# Patient Record
Sex: FEMALE | Race: WHITE | NOT HISPANIC OR LATINO | Employment: OTHER | URBAN - METROPOLITAN AREA
[De-identification: names, ages, dates, MRNs, and addresses within clinical notes are randomized per-mention and may not be internally consistent; named-entity substitution may affect disease eponyms.]

---

## 2017-12-20 ENCOUNTER — HOSPITAL ENCOUNTER (EMERGENCY)
Facility: HOSPITAL | Age: 67
Discharge: HOME | End: 2017-12-20
Attending: EMERGENCY MEDICINE | Admitting: EMERGENCY MEDICINE
Payer: MEDICARE

## 2017-12-20 VITALS
BODY MASS INDEX: 23.79 KG/M2 | OXYGEN SATURATION: 95 % | HEIGHT: 68 IN | SYSTOLIC BLOOD PRESSURE: 158 MMHG | WEIGHT: 157 LBS | TEMPERATURE: 98.6 F | HEART RATE: 90 BPM | RESPIRATION RATE: 20 BRPM | DIASTOLIC BLOOD PRESSURE: 70 MMHG

## 2017-12-20 DIAGNOSIS — J45.21 MILD INTERMITTENT ASTHMA WITH EXACERBATION: Primary | ICD-10-CM

## 2017-12-20 PROCEDURE — 96365 THER/PROPH/DIAG IV INF INIT: CPT

## 2017-12-20 PROCEDURE — 96375 TX/PRO/DX INJ NEW DRUG ADDON: CPT

## 2017-12-20 PROCEDURE — 94640 AIRWAY INHALATION TREATMENT: CPT

## 2017-12-20 PROCEDURE — 99283 EMERGENCY DEPT VISIT LOW MDM: CPT

## 2017-12-20 RX ORDER — ALBUTEROL SULFATE 2.5 MG/3ML
5 SOLUTION RESPIRATORY (INHALATION) ONCE
Status: COMPLETED | OUTPATIENT
Start: 2017-12-20 | End: 2017-12-20

## 2017-12-20 RX ORDER — MAGNESIUM SULFATE HEPTAHYDRATE 40 MG/ML
2 INJECTION, SOLUTION INTRAVENOUS ONCE
Status: COMPLETED | OUTPATIENT
Start: 2017-12-20 | End: 2017-12-20

## 2017-12-20 RX ORDER — AZITHROMYCIN 250 MG/1
TABLET, FILM COATED ORAL
Qty: 6 TABLET | Refills: 0 | Status: SHIPPED | OUTPATIENT
Start: 2017-12-20

## 2017-12-20 RX ORDER — METHYLPREDNISOLONE SODIUM SUCCINATE 125 MG/2ML
125 INJECTION, POWDER, LYOPHILIZED, FOR SOLUTION INTRAMUSCULAR; INTRAVENOUS ONCE
Status: COMPLETED | OUTPATIENT
Start: 2017-12-20 | End: 2017-12-20

## 2017-12-20 RX ORDER — PREDNISONE 20 MG/1
TABLET ORAL
Qty: 24 TABLET | Refills: 0 | Status: SHIPPED | OUTPATIENT
Start: 2017-12-20

## 2017-12-20 RX ORDER — ALBUTEROL SULFATE 2.5 MG/3ML
SOLUTION RESPIRATORY (INHALATION)
Status: DISCONTINUED
Start: 2017-12-20 | End: 2017-12-20 | Stop reason: HOSPADM

## 2017-12-20 RX ORDER — DIAZEPAM 5 MG/ML
5 INJECTION, SOLUTION INTRAMUSCULAR; INTRAVENOUS ONCE
Status: COMPLETED | OUTPATIENT
Start: 2017-12-20 | End: 2017-12-20

## 2017-12-20 RX ORDER — SODIUM CHLORIDE FOR INHALATION 0.9 %
3 VIAL, NEBULIZER (ML) INHALATION ONCE
Status: COMPLETED | OUTPATIENT
Start: 2017-12-20 | End: 2017-12-20

## 2017-12-20 RX ADMIN — IPRATROPIUM BROMIDE 0.5 MG: 0.5 SOLUTION RESPIRATORY (INHALATION) at 15:41

## 2017-12-20 RX ADMIN — DIAZEPAM 5 MG: 5 INJECTION, SOLUTION INTRAMUSCULAR; INTRAVENOUS at 14:39

## 2017-12-20 RX ADMIN — Medication 1 MG: at 14:35

## 2017-12-20 RX ADMIN — IPRATROPIUM BROMIDE 1 MG: 0.5 SOLUTION RESPIRATORY (INHALATION) at 14:35

## 2017-12-20 RX ADMIN — ALBUTEROL SULFATE 5 MG: 2.5 SOLUTION RESPIRATORY (INHALATION) at 15:41

## 2017-12-20 RX ADMIN — ALBUTEROL SULFATE 10 MG: 2.5 SOLUTION RESPIRATORY (INHALATION) at 14:34

## 2017-12-20 RX ADMIN — METHYLPREDNISOLONE SODIUM SUCCINATE 125 MG: 125 INJECTION, POWDER, FOR SOLUTION INTRAMUSCULAR; INTRAVENOUS at 14:38

## 2017-12-20 RX ADMIN — ISODIUM CHLORIDE 3 ML: 0.03 SOLUTION RESPIRATORY (INHALATION) at 14:35

## 2017-12-20 RX ADMIN — MAGNESIUM SULFATE HEPTAHYDRATE 2 G: 40 INJECTION, SOLUTION INTRAVENOUS at 14:39

## 2017-12-20 NOTE — ED PROVIDER NOTES
History  Chief Complaint   Patient presents with    Asthma     Pt with asthma flare up, states her at home nebulizers arent really helping anymore  Pt with audible wheezing but otherwise healthy appearing female in no apparent distress      49-year-old female presenting here today with increased shortness of breath  She reports she has a history of asthma        Asthma   Associated symptoms: cough, shortness of breath and wheezing    Associated symptoms: no abdominal pain, no congestion, no diarrhea, no ear pain, no fatigue, no fever, no headaches, no myalgias, no nausea, no rhinorrhea, no sore throat and no vomiting        None       Past Medical History:   Diagnosis Date    Asthma     Hypertension        Past Surgical History:   Procedure Laterality Date    APPENDECTOMY         History reviewed  No pertinent family history  I have reviewed and agree with the history as documented  Social History   Substance Use Topics    Smoking status: Never Smoker    Smokeless tobacco: Never Used    Alcohol use No        Review of Systems   Constitutional: Negative for activity change, fatigue and fever  HENT: Negative for congestion, ear pain, rhinorrhea and sore throat  Eyes: Negative  Respiratory: Positive for cough, shortness of breath and wheezing  Gastrointestinal: Negative for abdominal pain, diarrhea, nausea and vomiting  Endocrine: Negative  Genitourinary: Negative for difficulty urinating, dyspareunia, dysuria, flank pain, frequency, menstrual problem, pelvic pain, urgency, vaginal bleeding, vaginal discharge and vaginal pain  Musculoskeletal: Negative for arthralgias and myalgias  Skin: Negative for color change and pallor  Neurological: Negative for dizziness, speech difficulty, weakness and headaches  Hematological: Negative for adenopathy  Psychiatric/Behavioral: Negative for confusion         Physical Exam  ED Triage Vitals [12/20/17 1421]   Temperature Pulse Respirations Blood Pressure SpO2   98 6 °F (37 °C) 84 22 (!) 208/93 95 %      Temp Source Heart Rate Source Patient Position - Orthostatic VS BP Location FiO2 (%)   Oral Monitor Sitting Left arm --      Pain Score       No Pain           Orthostatic Vital Signs  Vitals:    12/20/17 1421 12/20/17 1541 12/20/17 1703   BP: (!) 208/93 153/70 158/70   Pulse: 84 83 90   Patient Position - Orthostatic VS: Sitting Sitting Sitting       Physical Exam   Constitutional: She is oriented to person, place, and time  She appears well-developed and well-nourished  She is cooperative  Non-toxic appearance  She does not have a sickly appearance  She does not appear ill  No distress  HENT:   Head: Normocephalic and atraumatic  Right Ear: Tympanic membrane and external ear normal    Left Ear: Tympanic membrane and external ear normal    Nose: No rhinorrhea, sinus tenderness or nasal deformity  No epistaxis  Right sinus exhibits no maxillary sinus tenderness and no frontal sinus tenderness  Left sinus exhibits no maxillary sinus tenderness and no frontal sinus tenderness  Mouth/Throat: Oropharynx is clear and moist and mucous membranes are normal  Normal dentition  Eyes: EOM are normal  Pupils are equal, round, and reactive to light  Neck: Normal range of motion  Neck supple  Cardiovascular: Normal rate, regular rhythm and normal heart sounds  No murmur heard  Pulmonary/Chest: Effort normal  No accessory muscle usage  No respiratory distress  She has wheezes  She has no rales  She exhibits no tenderness  Abdominal: Soft  She exhibits no distension  There is no guarding  Musculoskeletal: Normal range of motion  She exhibits no edema or tenderness  Lymphadenopathy:     She has no cervical adenopathy  Neurological: She is alert and oriented to person, place, and time  She exhibits normal muscle tone  Skin: Skin is warm and dry  No rash noted  No erythema  Psychiatric: She has a normal mood and affect     Nursing note and vitals reviewed  ED Medications  Medications   albuterol (2 5 mg/3 mL) 0 083 % inhalation solution **AcuDose Override Pull** (  Not Given 12/20/17 1458)   albuterol inhalation solution 10 mg (10 mg Nebulization Given 12/20/17 1434)   ipratropium (ATROVENT) 0 02 % inhalation solution 1 mg (1 mg Nebulization Given 12/20/17 1435)   sodium chloride 0 9 % inhalation solution 3 mL (3 mL Nebulization Given 12/20/17 1435)   methylPREDNISolone sodium succinate (Solu-MEDROL) injection 125 mg (125 mg Intravenous Given 12/20/17 1438)   magnesium sulfate 2 g/50 mL IVPB (premix) 2 g (0 g Intravenous Stopped 12/20/17 1543)   diazepam (VALIUM) injection 5 mg (5 mg Intravenous Given 12/20/17 1439)   albuterol inhalation solution 5 mg (5 mg Nebulization Given 12/20/17 1541)   ipratropium (ATROVENT) 0 02 % inhalation solution 0 5 mg (0 5 mg Nebulization Given 12/20/17 1541)       Diagnostic Studies  Results Reviewed     None                 No orders to display              Procedures  Procedures       Phone Contacts  ED Phone Contact    ED Course  ED Course                                MDM  Number of Diagnoses or Management Options  Mild intermittent asthma with exacerbation: new and requires workup     Amount and/or Complexity of Data Reviewed  Independent visualization of images, tracings, or specimens: yes    Patient Progress  Patient progress: stable    CritCare Time    Disposition  Final diagnoses:   Mild intermittent asthma with exacerbation     Time reflects when diagnosis was documented in both MDM as applicable and the Disposition within this note     Time User Action Codes Description Comment    12/20/2017  4:58 PM Devika Amaya Add [J45 21] Mild intermittent asthma with exacerbation       ED Disposition     ED Disposition Condition Comment    Discharge  [address] discharge to home/self care      Condition at discharge: Good        Follow-up Information     Follow up With Specialties Details Why Contact Info Additional Information    Regina Carlton MD Internal Medicine  For Continued Evaluation [address] NP [address] Emergency Department Emergency Medicine  If your symptoms worsen, or you are not improving  [address] [facility] [address] [phone] [place] [facility], [address], [place], [address]        Patient's Medications   Discharge Prescriptions    AZITHROMYCIN (ZITHROMAX) 250 MG TABLET    Take 500 mg day 1, 250 mg days 2-5       Start Date: 12/20/2017End Date: --       Order Dose: --       Quantity: 6 tablet    Refills: 0    PREDNISONE 20 MG TABLET    Take 60 mg by mouth daily ×4 days, 40 mg by mouth daily ×4 days, 20 mg by mouth daily ×4 days       Start Date: 12/20/2017End Date: --       Order Dose: --       Quantity: 24 tablet    Refills: 0     No discharge procedures on file      ED Provider  Electronically Signed by           LENO Daniels  12/20/17 8816

## 2017-12-20 NOTE — DISCHARGE INSTRUCTIONS
Asthma, Ambulatory Care   GENERAL INFORMATION:   Asthma  is a lung disease that makes breathing difficult  Chronic inflammation and reactions to triggers narrow the airways in your lungs  Asthma can become life-threatening if it is not managed  Common symptoms include the following:   · Coughing     · Wheezing     · Shortness of breath     · Chest tightness  Seek immediate care for the following symptoms:   · Severe shortness of breath    · Blue or gray lips or nails    · Skin around your neck and ribs pulls in with each breath    · Shortness of breath, even after you take your short-term medicine as directed     · Peak flow numbers in the red zone of your asthma action plan  Treatment for asthma  will depend on how severe it is  Medicine may decrease inflammation, open airways, and make it easier to breathe  Medicines may be inhaled, taken as a pill, or injected  Short-term medicines relieve your symptoms quickly  Long-term medicines are used to prevent future attacks  You may also need medicine to help control your allergies  Manage and prevent future asthma attacks:   · Follow your asthma action pan  This is a written plan that you and your healthcare provider create  It explains which medicine you need and when to change doses if necessary  It also explains how you can monitor symptoms and use a peak flow meter  The meter measures how well your lungs are working  · Manage other health conditions , such as allergies, acid reflux, and sleep apnea  · Identify and avoid triggers  These may include pets, dust mites, mold, and cockroaches  · Do not smoke and avoid others who smoke  If you smoke, it is never too late to quit  Ask your healthcare provider if you need help quitting  · Ask about a flu vaccine  The flu can make your asthma worse  You may need a yearly flu shot  Follow up with your healthcare provider as directed:   You will need to return to make sure your medicine is working and your symptoms are controlled  You may be referred to an asthma or allergy specialist  Damaris Rodriguez may be asked to keep a record of your peak flow values and bring it with you to your appointments  Write down your questions so you remember to ask them during your visits  CARE AGREEMENT:   You have the right to help plan your care  Learn about your health condition and how it may be treated  Discuss treatment options with your caregivers to decide what care you want to receive  You always have the right to refuse treatment  The above information is an  only  It is not intended as medical advice for individual conditions or treatments  Talk to your doctor, nurse or pharmacist before following any medical regimen to see if it is safe and effective for you  © 2014 [address] is for End User's use only and may not be sold, redistributed or otherwise used for commercial purposes  All illustrations and images included in CareNotes® are the copyrighted property of [employer] D [employer] M [employer]  or Yossi Jin

## 2019-12-04 ENCOUNTER — HOSPITAL ENCOUNTER (EMERGENCY)
Facility: HOSPITAL | Age: 69
Discharge: HOME | End: 2019-12-04
Attending: EMERGENCY MEDICINE | Admitting: EMERGENCY MEDICINE
Payer: MEDICARE

## 2019-12-04 VITALS
HEART RATE: 75 BPM | TEMPERATURE: 97.4 F | SYSTOLIC BLOOD PRESSURE: 171 MMHG | OXYGEN SATURATION: 96 % | RESPIRATION RATE: 17 BRPM | DIASTOLIC BLOOD PRESSURE: 75 MMHG

## 2019-12-04 DIAGNOSIS — J45.901 ACUTE ASTHMA EXACERBATION: Primary | ICD-10-CM

## 2019-12-04 PROCEDURE — 94640 AIRWAY INHALATION TREATMENT: CPT

## 2019-12-04 PROCEDURE — 99283 EMERGENCY DEPT VISIT LOW MDM: CPT

## 2019-12-04 PROCEDURE — 99284 EMERGENCY DEPT VISIT MOD MDM: CPT | Performed by: PHYSICIAN ASSISTANT

## 2019-12-04 RX ORDER — ALBUTEROL SULFATE 90 UG/1
2 AEROSOL, METERED RESPIRATORY (INHALATION) ONCE
Status: COMPLETED | OUTPATIENT
Start: 2019-12-04 | End: 2019-12-04

## 2019-12-04 RX ORDER — PREDNISONE 20 MG/1
60 TABLET ORAL ONCE
Status: COMPLETED | OUTPATIENT
Start: 2019-12-04 | End: 2019-12-04

## 2019-12-04 RX ORDER — PREDNISONE 10 MG/1
10 TABLET ORAL DAILY
Qty: 63 TABLET | Refills: 0 | Status: SHIPPED | OUTPATIENT
Start: 2019-12-04

## 2019-12-04 RX ORDER — ALBUTEROL SULFATE 1.25 MG/3ML
1 SOLUTION RESPIRATORY (INHALATION) EVERY 6 HOURS PRN
Qty: 30 VIAL | Refills: 0 | Status: SHIPPED | OUTPATIENT
Start: 2019-12-04

## 2019-12-04 RX ORDER — ALBUTEROL SULFATE 2.5 MG/3ML
5 SOLUTION RESPIRATORY (INHALATION) ONCE
Status: COMPLETED | OUTPATIENT
Start: 2019-12-04 | End: 2019-12-04

## 2019-12-04 RX ADMIN — ALBUTEROL SULFATE 2 PUFF: 90 AEROSOL, METERED RESPIRATORY (INHALATION) at 16:05

## 2019-12-04 RX ADMIN — PREDNISONE 60 MG: 20 TABLET ORAL at 15:34

## 2019-12-04 RX ADMIN — ALBUTEROL SULFATE 5 MG: 2.5 SOLUTION RESPIRATORY (INHALATION) at 15:34

## 2019-12-04 RX ADMIN — IPRATROPIUM BROMIDE 0.5 MG: 0.5 SOLUTION RESPIRATORY (INHALATION) at 15:34

## 2019-12-04 NOTE — ED PROVIDER NOTES
History  Chief Complaint   Patient presents with    Asthma     "my asthma is acting up" treatments at home not working, onset 1-2 weeks      70-year-old female patient here for evaluation of asthma exacerbation  Onset about 1 week ago  She notes she keeps getting exposed to something work that is causing her to flare up  She took 2 days off work and got better however when she went back she started have recurring symptoms  Used a breathing treatment earlier this morning which seemed to help but states at this point she feels she needs steroids  She has been admitted in the past and also intubated in the past for her asthma  Denies anything unusual about this exacerbation, compared to prior exacerbations that is currently less severe  She denies fevers chills nausea vomiting or cough  No leg pain or swelling no recent travels, traumas, surgeries  History provided by:  Patient   used: No    Shortness of Breath   Severity:  Mild  Onset quality:  Gradual  Duration:  1 week  Timing:  Intermittent  Progression:  Worsening  Chronicity:  New  Context: occupational exposure    Context: not activity, not animal exposure, not emotional upset, not fumes, not known allergens, not pollens, not smoke exposure, not strong odors, not URI and not weather changes    Relieved by: Inhaler  Worsened by:  Nothing  Ineffective treatments:  None tried  Associated symptoms: wheezing    Associated symptoms: no abdominal pain, no chest pain, no claudication, no cough, no diaphoresis, no ear pain, no fever, no headaches, no hemoptysis, no neck pain, no PND, no rash, no sore throat, no sputum production, no syncope, no swollen glands and no vomiting        Prior to Admission Medications   Prescriptions Last Dose Informant Patient Reported? Taking?    azithromycin (ZITHROMAX) 250 mg tablet   No No   Sig: Take 500 mg day 1, 250 mg days 2-5   predniSONE 20 mg tablet   No No   Sig: Take 60 mg by mouth daily ×4 days, 40 mg by mouth daily ×4 days, 20 mg by mouth daily ×4 days      Facility-Administered Medications: None       Past Medical History:   Diagnosis Date    Asthma     Hypertension        Past Surgical History:   Procedure Laterality Date    APPENDECTOMY         History reviewed  No pertinent family history  I have reviewed and agree with the history as documented  Social History     Tobacco Use    Smoking status: Never Smoker    Smokeless tobacco: Never Used   Substance Use Topics    Alcohol use: No    Drug use: No        Review of Systems   Constitutional: Negative for activity change, appetite change, chills, diaphoresis, fatigue, fever and unexpected weight change  HENT: Negative for congestion, ear pain, rhinorrhea, sinus pressure, sore throat and trouble swallowing  Eyes: Negative for photophobia and visual disturbance  Respiratory: Positive for shortness of breath and wheezing  Negative for apnea, cough, hemoptysis, sputum production, choking, chest tightness and stridor  Cardiovascular: Negative for chest pain, palpitations, claudication, leg swelling, syncope and PND  Gastrointestinal: Negative for abdominal distention, abdominal pain, blood in stool, constipation, diarrhea, nausea and vomiting  Genitourinary: Negative for decreased urine volume, difficulty urinating, dysuria, enuresis, flank pain, frequency, hematuria and urgency  Musculoskeletal: Negative for arthralgias, myalgias, neck pain and neck stiffness  Skin: Negative for color change, pallor, rash and wound  Allergic/Immunologic: Negative  Neurological: Negative for dizziness, tremors, syncope, weakness, light-headedness, numbness and headaches  Hematological: Negative  Psychiatric/Behavioral: Negative  All other systems reviewed and are negative  Physical Exam  Physical Exam   Constitutional: She is oriented to person, place, and time  She appears well-developed and well-nourished    Non-toxic appearance  She does not have a sickly appearance  She does not appear ill  No distress  HENT:   Head: Normocephalic and atraumatic  Eyes: Pupils are equal, round, and reactive to light  EOM and lids are normal    Neck: Normal range of motion  Neck supple  Cardiovascular: Normal rate, regular rhythm, S1 normal, S2 normal, normal heart sounds, intact distal pulses and normal pulses  Exam reveals no gallop, no distant heart sounds, no friction rub and no decreased pulses  No murmur heard  Pulses:       Radial pulses are 2+ on the right side, and 2+ on the left side  Pulmonary/Chest: Effort normal  No accessory muscle usage  No apnea, no tachypnea and no bradypnea  No respiratory distress  She has no decreased breath sounds  She has wheezes (diffuse bilateral)  She has no rhonchi  She has no rales  Abdominal: Soft  Normal appearance  She exhibits no distension  There is no tenderness  There is no rigidity, no rebound and no guarding  Musculoskeletal: Normal range of motion  She exhibits no edema, tenderness or deformity  Neurological: She is alert and oriented to person, place, and time  No cranial nerve deficit  GCS eye subscore is 4  GCS verbal subscore is 5  GCS motor subscore is 6  Skin: Skin is warm, dry and intact  No rash noted  She is not diaphoretic  No erythema  No pallor  Psychiatric: Her speech is normal    Nursing note and vitals reviewed        Vital Signs  ED Triage Vitals [12/04/19 1451]   Temperature Pulse Respirations Blood Pressure SpO2   (!) 97 4 °F (36 3 °C) 75 17 (!) 171/75 96 %      Temp Source Heart Rate Source Patient Position - Orthostatic VS BP Location FiO2 (%)   Oral Monitor Sitting Left arm --      Pain Score       --           Vitals:    12/04/19 1451   BP: (!) 171/75   Pulse: 75   Patient Position - Orthostatic VS: Sitting         Visual Acuity      ED Medications  Medications - No data to display    Diagnostic Studies  Results Reviewed     None                 No orders to display              Procedures  Procedures         ED Course                               MDM  Number of Diagnoses or Management Options  Acute asthma exacerbation: new and requires workup  Diagnosis management comments: ddx includes but is not limited to asthma, bronchitis, pneumonia, doubt pe, doubt acs/mi  Plan: breathing tx  Steroid  dispo pending  Risk of Complications, Morbidity, and/or Mortality  Presenting problems: low  Management options: low  General comments: 49-year-old female patient with wheezing  She is given breathing treatment here and she is feeling better  First of steroid here  She would like to go home  She has done well with taper dose steroids in the past, will give a taper steroid prescription  Discussed follow up with family physician as needed and returning to the ER if symptoms suddenly worsen  Patient ambulated department feeling much better  Agreed with plan  Patient Progress  Patient progress: stable        Disposition  Final diagnoses:   None     ED Disposition     None      Follow-up Information    None         Patient's Medications   Discharge Prescriptions    No medications on file     No discharge procedures on file      ED Provider  Electronically Signed by           Eden Traore PA-C  12/04/19 0483

## 2021-03-30 DIAGNOSIS — Z23 ENCOUNTER FOR IMMUNIZATION: ICD-10-CM

## 2022-06-02 ENCOUNTER — APPOINTMENT (EMERGENCY)
Dept: RADIOLOGY | Facility: HOSPITAL | Age: 72
End: 2022-06-02
Payer: MEDICARE

## 2022-06-02 ENCOUNTER — HOSPITAL ENCOUNTER (EMERGENCY)
Facility: HOSPITAL | Age: 72
Discharge: HOME | End: 2022-06-02
Attending: EMERGENCY MEDICINE | Admitting: EMERGENCY MEDICINE
Payer: MEDICARE

## 2022-06-02 VITALS
RESPIRATION RATE: 18 BRPM | OXYGEN SATURATION: 96 % | HEART RATE: 89 BPM | SYSTOLIC BLOOD PRESSURE: 166 MMHG | TEMPERATURE: 97.9 F | DIASTOLIC BLOOD PRESSURE: 79 MMHG

## 2022-06-02 DIAGNOSIS — J45.901 ASTHMA EXACERBATION: ICD-10-CM

## 2022-06-02 DIAGNOSIS — M62.838 MUSCLE SPASM: Primary | ICD-10-CM

## 2022-06-02 LAB
2HR DELTA HS TROPONIN: -1 NG/L
ALBUMIN SERPL BCP-MCNC: 4.7 G/DL (ref 3.5–5)
ALP SERPL-CCNC: 82 U/L (ref 46–116)
ALT SERPL W P-5'-P-CCNC: 45 U/L (ref 12–78)
ANION GAP SERPL CALCULATED.3IONS-SCNC: 13 MMOL/L (ref 4–13)
AST SERPL W P-5'-P-CCNC: 31 U/L (ref 5–45)
BASE EX.OXY STD BLDV CALC-SCNC: 84.6 % (ref 60–80)
BASE EXCESS BLDV CALC-SCNC: 0.9 MMOL/L
BASOPHILS # BLD AUTO: 0.11 THOUSANDS/ΜL (ref 0–0.1)
BASOPHILS NFR BLD AUTO: 1 % (ref 0–1)
BILIRUB SERPL-MCNC: 0.68 MG/DL (ref 0.2–1)
BILIRUB UR QL STRIP: NEGATIVE
BUN SERPL-MCNC: 15 MG/DL (ref 5–25)
CALCIUM SERPL-MCNC: 8.7 MG/DL (ref 8.3–10.1)
CARDIAC TROPONIN I PNL SERPL HS: 3 NG/L
CARDIAC TROPONIN I PNL SERPL HS: 4 NG/L
CHLORIDE SERPL-SCNC: 96 MMOL/L (ref 100–108)
CK MB SERPL-MCNC: 1.9 NG/ML (ref 0–5)
CK MB SERPL-MCNC: <1 % (ref 0–2.5)
CK SERPL-CCNC: 369 U/L (ref 26–192)
CLARITY UR: CLEAR
CO2 SERPL-SCNC: 25 MMOL/L (ref 21–32)
COLOR UR: YELLOW
CREAT SERPL-MCNC: 1.23 MG/DL (ref 0.6–1.3)
EOSINOPHIL # BLD AUTO: 0.38 THOUSAND/ΜL (ref 0–0.61)
EOSINOPHIL NFR BLD AUTO: 3 % (ref 0–6)
ERYTHROCYTE [DISTWIDTH] IN BLOOD BY AUTOMATED COUNT: 12.1 % (ref 11.6–15.1)
GFR SERPL CREATININE-BSD FRML MDRD: 44 ML/MIN/1.73SQ M
GLUCOSE SERPL-MCNC: 120 MG/DL (ref 65–140)
GLUCOSE UR STRIP-MCNC: NEGATIVE MG/DL
HCO3 BLDV-SCNC: 25.7 MMOL/L (ref 24–30)
HCT VFR BLD AUTO: 49.5 % (ref 34.8–46.1)
HGB BLD-MCNC: 16.6 G/DL (ref 11.5–15.4)
HGB UR QL STRIP.AUTO: NEGATIVE
IMM GRANULOCYTES # BLD AUTO: 0.06 THOUSAND/UL (ref 0–0.2)
IMM GRANULOCYTES NFR BLD AUTO: 1 % (ref 0–2)
KETONES UR STRIP-MCNC: NEGATIVE MG/DL
LEUKOCYTE ESTERASE UR QL STRIP: NEGATIVE
LYMPHOCYTES # BLD AUTO: 2.19 THOUSANDS/ΜL (ref 0.6–4.47)
LYMPHOCYTES NFR BLD AUTO: 18 % (ref 14–44)
MAGNESIUM SERPL-MCNC: 1.9 MG/DL (ref 1.6–2.6)
MCH RBC QN AUTO: 30.6 PG (ref 26.8–34.3)
MCHC RBC AUTO-ENTMCNC: 33.5 G/DL (ref 31.4–37.4)
MCV RBC AUTO: 91 FL (ref 82–98)
MONOCYTES # BLD AUTO: 0.75 THOUSAND/ΜL (ref 0.17–1.22)
MONOCYTES NFR BLD AUTO: 6 % (ref 4–12)
NEUTROPHILS # BLD AUTO: 9.02 THOUSANDS/ΜL (ref 1.85–7.62)
NEUTS SEG NFR BLD AUTO: 71 % (ref 43–75)
NITRITE UR QL STRIP: NEGATIVE
NRBC BLD AUTO-RTO: 0 /100 WBCS
O2 CT BLDV-SCNC: 20.9 ML/DL
PCO2 BLDV: 41.5 MM HG (ref 42–50)
PH BLDV: 7.41 [PH] (ref 7.3–7.4)
PH UR STRIP.AUTO: 5.5 [PH]
PLATELET # BLD AUTO: 278 THOUSANDS/UL (ref 149–390)
PMV BLD AUTO: 9.9 FL (ref 8.9–12.7)
PO2 BLDV: 48.9 MM HG (ref 35–45)
POTASSIUM SERPL-SCNC: 4.1 MMOL/L (ref 3.5–5.3)
PROT SERPL-MCNC: 7.7 G/DL (ref 6.4–8.2)
PROT UR STRIP-MCNC: NEGATIVE MG/DL
RBC # BLD AUTO: 5.42 MILLION/UL (ref 3.81–5.12)
SODIUM SERPL-SCNC: 134 MMOL/L (ref 136–145)
SP GR UR STRIP.AUTO: 1.01 (ref 1–1.03)
UROBILINOGEN UR QL STRIP.AUTO: 0.2 E.U./DL
WBC # BLD AUTO: 12.51 THOUSAND/UL (ref 4.31–10.16)

## 2022-06-02 PROCEDURE — 99284 EMERGENCY DEPT VISIT MOD MDM: CPT

## 2022-06-02 PROCEDURE — 82805 BLOOD GASES W/O2 SATURATION: CPT | Performed by: EMERGENCY MEDICINE

## 2022-06-02 PROCEDURE — 83735 ASSAY OF MAGNESIUM: CPT | Performed by: EMERGENCY MEDICINE

## 2022-06-02 PROCEDURE — 82553 CREATINE MB FRACTION: CPT | Performed by: EMERGENCY MEDICINE

## 2022-06-02 PROCEDURE — 36415 COLL VENOUS BLD VENIPUNCTURE: CPT | Performed by: EMERGENCY MEDICINE

## 2022-06-02 PROCEDURE — 93005 ELECTROCARDIOGRAM TRACING: CPT

## 2022-06-02 PROCEDURE — 94640 AIRWAY INHALATION TREATMENT: CPT

## 2022-06-02 PROCEDURE — 81003 URINALYSIS AUTO W/O SCOPE: CPT | Performed by: EMERGENCY MEDICINE

## 2022-06-02 PROCEDURE — 85025 COMPLETE CBC W/AUTO DIFF WBC: CPT | Performed by: EMERGENCY MEDICINE

## 2022-06-02 PROCEDURE — 82550 ASSAY OF CK (CPK): CPT | Performed by: EMERGENCY MEDICINE

## 2022-06-02 PROCEDURE — 99285 EMERGENCY DEPT VISIT HI MDM: CPT | Performed by: EMERGENCY MEDICINE

## 2022-06-02 PROCEDURE — 84484 ASSAY OF TROPONIN QUANT: CPT | Performed by: EMERGENCY MEDICINE

## 2022-06-02 PROCEDURE — 71046 X-RAY EXAM CHEST 2 VIEWS: CPT

## 2022-06-02 PROCEDURE — 80053 COMPREHEN METABOLIC PANEL: CPT | Performed by: EMERGENCY MEDICINE

## 2022-06-02 RX ORDER — ALBUTEROL SULFATE 2.5 MG/3ML
5 SOLUTION RESPIRATORY (INHALATION) ONCE
Status: COMPLETED | OUTPATIENT
Start: 2022-06-02 | End: 2022-06-02

## 2022-06-02 RX ORDER — DIAZEPAM 2 MG/1
2 TABLET ORAL ONCE
Status: COMPLETED | OUTPATIENT
Start: 2022-06-02 | End: 2022-06-02

## 2022-06-02 RX ORDER — SODIUM CHLORIDE 9 MG/ML
3 INJECTION INTRAVENOUS
Status: DISCONTINUED | OUTPATIENT
Start: 2022-06-02 | End: 2022-06-02 | Stop reason: HOSPADM

## 2022-06-02 RX ORDER — PREDNISONE 20 MG/1
60 TABLET ORAL DAILY
Qty: 12 TABLET | Refills: 0 | Status: SHIPPED | OUTPATIENT
Start: 2022-06-02 | End: 2022-06-06

## 2022-06-02 RX ADMIN — IPRATROPIUM BROMIDE 0.5 MG: 0.5 SOLUTION RESPIRATORY (INHALATION) at 09:55

## 2022-06-02 RX ADMIN — ALBUTEROL SULFATE 5 MG: 2.5 SOLUTION RESPIRATORY (INHALATION) at 09:55

## 2022-06-02 RX ADMIN — SODIUM CHLORIDE 1000 ML: 0.9 INJECTION, SOLUTION INTRAVENOUS at 09:56

## 2022-06-02 RX ADMIN — DIAZEPAM 2 MG: 2 TABLET ORAL at 09:55

## 2022-06-02 NOTE — ED PROVIDER NOTES
History  Chief Complaint   Patient presents with    Spasms     C/o torso, legs, arms muscle spasms since 8p last night, worse with movement  Takes lasix and has been increasing her albuterol use bc of her asthma exacerbation     71 y/o female presents to the ED for muscle spasms since last night  Patient states that she gets muscle spasms throughout her entire body, worse in her torso and with movement  She states that she has had these in the past but they werent as severe and typically happen with steroids, which she is currently not on  She states that she takes Lasix prn For swelling and last took 40 mg yesterday  She also states that due to the weather changes and season her asthma has been worse over the last couple of days and she has been using her albuterol more frequently  She denies any chest pain, fevers/chills, abdominal pain constipation, or urinary symptoms  She has not tried anything for the symptoms  No other complaints  History provided by:  Patient      Prior to Admission Medications   Prescriptions Last Dose Informant Patient Reported? Taking? albuterol (ACCUNEB) 1 25 MG/3ML nebulizer solution   No No   Sig: Take 3 mL (1 25 mg total) by nebulization every 6 (six) hours as needed for wheezing or shortness of breath   azithromycin (ZITHROMAX) 250 mg tablet   No No   Sig: Take 500 mg day 1, 250 mg days 2-5   predniSONE 10 mg tablet   No No   Sig: Take 1 tablet (10 mg total) by mouth daily 6 tabs for 3 days and then decrease by one tab every 3 days until complete   predniSONE 20 mg tablet   No No   Sig: Take 60 mg by mouth daily ×4 days, 40 mg by mouth daily ×4 days, 20 mg by mouth daily ×4 days      Facility-Administered Medications: None       Past Medical History:   Diagnosis Date    Asthma     Hypertension        Past Surgical History:   Procedure Laterality Date    APPENDECTOMY         History reviewed  No pertinent family history    I have reviewed and agree with the history as documented  E-Cigarette/Vaping     E-Cigarette/Vaping Substances     Social History     Tobacco Use    Smoking status: Never Smoker    Smokeless tobacco: Never Used   Substance Use Topics    Alcohol use: No    Drug use: No       Review of Systems   Constitutional: Negative for chills and fever  HENT: Negative for congestion, ear pain and sore throat  Eyes: Negative for pain and visual disturbance  Respiratory: Negative for cough, shortness of breath and wheezing  Cardiovascular: Negative for chest pain and leg swelling  Gastrointestinal: Negative for abdominal pain, diarrhea, nausea and vomiting  Genitourinary: Negative for dysuria, frequency, hematuria and urgency  Musculoskeletal: Negative for neck pain and neck stiffness  Skin: Negative for rash and wound  Neurological: Negative for weakness, numbness and headaches  Psychiatric/Behavioral: Negative for agitation and confusion  All other systems reviewed and are negative  Physical Exam  Physical Exam  Vitals and nursing note reviewed  Constitutional:       Appearance: Normal appearance  She is well-developed  HENT:      Head: Normocephalic and atraumatic  Eyes:      Pupils: Pupils are equal, round, and reactive to light  Cardiovascular:      Rate and Rhythm: Normal rate and regular rhythm  Pulmonary:      Effort: Pulmonary effort is normal       Breath sounds: Wheezing present  Comments: occassional wheeze   Abdominal:      General: Bowel sounds are normal       Palpations: Abdomen is soft  Musculoskeletal:         General: Normal range of motion  Cervical back: Normal range of motion and neck supple  Skin:     General: Skin is warm and dry  Neurological:      General: No focal deficit present  Mental Status: She is alert and oriented to person, place, and time        Comments: No focal deficits         Vital Signs  ED Triage Vitals [06/02/22 0904]   Temperature Pulse Respirations Blood Pressure SpO2   97 9 °F (36 6 °C) (!) 111 19 162/79 93 %      Temp Source Heart Rate Source Patient Position - Orthostatic VS BP Location FiO2 (%)   Oral Monitor Standing Left arm --      Pain Score       8           Vitals:    06/02/22 0904 06/02/22 1000 06/02/22 1132   BP: 162/79 163/88 166/79   Pulse: (!) 111 88 89   Patient Position - Orthostatic VS: Standing Sitting Lying         Visual Acuity      ED Medications  Medications   sodium chloride (PF) 0 9 % injection 3 mL (has no administration in time range)   sodium chloride 0 9 % bolus 1,000 mL (1,000 mL Intravenous New Bag 6/2/22 0956)   diazepam (VALIUM) tablet 2 mg (2 mg Oral Given 6/2/22 0955)   albuterol inhalation solution 5 mg (5 mg Nebulization Given 6/2/22 0955)   ipratropium (ATROVENT) 0 02 % inhalation solution 0 5 mg (0 5 mg Nebulization Given 6/2/22 0955)       Diagnostic Studies  Results Reviewed     Procedure Component Value Units Date/Time    UA w Reflex to Microscopic w Reflex to Culture [[id number]] Collected: 06/02/22 1123    Lab Status: Final result Specimen: Urine, Clean Catch Updated: 06/02/22 1137     Color, UA Yellow     Clarity, UA Clear     Specific Gravity, UA 1 010     pH, UA 5 5     Leukocytes, UA Negative     Nitrite, UA Negative     Protein, UA Negative mg/dl      Glucose, UA Negative mg/dl      Ketones, UA Negative mg/dl      Urobilinogen, UA 0 2 E U /dl      Bilirubin, UA Negative     Blood, UA Negative    HS Troponin I 4hr [[id number]]     Lab Status: No result Specimen: Blood     HS Troponin I 2hr [[id number]] Collected: 06/02/22 1123    Lab Status: In process Specimen: Blood from Arm, Right Updated: 06/02/22 1125    CK (with reflex to MB) [[id number]]  (Abnormal) Collected: 06/02/22 0935    Lab Status: Final result Specimen: Blood from Arm, Right Updated: 06/02/22 1054     Total  U/L     CKMB [[id number]] Collected: 06/02/22 0935    Lab Status:  In process Specimen: Blood from Arm, Right Updated: 06/02/22 1054    HS Troponin 0hr (reflex protocol) [[id number]]  (Normal) Collected: 06/02/22 0935    Lab Status: Final result Specimen: Blood from Arm, Right Updated: 06/02/22 1031     hs TnI 0hr 4 ng/L     Comprehensive metabolic panel [[id number]]  (Abnormal) Collected: 06/02/22 0935    Lab Status: Final result Specimen: Blood from Arm, Right Updated: 06/02/22 1027     Sodium 134 mmol/L      Potassium 4 1 mmol/L      Chloride 96 mmol/L      CO2 25 mmol/L      ANION GAP 13 mmol/L      BUN 15 mg/dL      Creatinine 1 23 mg/dL      Glucose 120 mg/dL      Calcium 8 7 mg/dL      AST 31 U/L      ALT 45 U/L      Alkaline Phosphatase 82 U/L      Total Protein 7 7 g/dL      Albumin 4 7 g/dL      Total Bilirubin 0 68 mg/dL      eGFR 44 ml/min/1 73sq m     Narrative:      National Kid[employer] Foundation guidelines for Chronic Kidney Disease (CKD):     Stage 1 with normal or high GFR (GFR > 90 mL/min/1 73 square meters)    Stage 2 Mild CKD (GFR = 60-89 mL/min/1 73 square meters)    Stage 3A Moderate CKD (GFR = 45-59 mL/min/1 73 square meters)    Stage 3B Moderate CKD (GFR = 30-44 mL/min/1 73 square meters)    Stage 4 Severe CKD (GFR = 15-29 mL/min/1 73 square meters)    Stage 5 End Stage CKD (GFR <15 mL/min/1 73 square meters)  Note: GFR calculation is accurate only with a steady state creatinine    Magnesium [[id number]]  (Normal) Collected: 06/02/22 0935    Lab Status: Final result Specimen: Blood from Arm, Right Updated: 06/02/22 1027     Magnesium 1 9 mg/dL     CBC and differential [[id number]]  (Abnormal) Collected: 06/02/22 0935    Lab Status: Final result Specimen: Blood from Arm, Right Updated: 06/02/22 0950     WBC 12 51 Thousand/uL      RBC 5 42 Million/uL      Hemoglobin 16 6 g/dL      Hematocrit 49 5 %      MCV 91 fL      MCH 30 6 pg      MCHC 33 5 g/dL      RDW 12 1 %      MPV 9 9 fL      Platelets 207 Thousands/uL      nRBC 0 /100 WBCs      Neutrophils Relative 71 %      Immat GRANS % 1 %      Lymphocytes Relative 18 %      Monocytes Relative 6 %      Eosinophils Relative 3 %      Basophils Relative 1 %      Neutrophils Absolute 9 02 Thousands/µL      Immature Grans Absolute 0 06 Thousand/uL      Lymphocytes Absolute 2 19 Thousands/µL      Monocytes Absolute 0 75 Thousand/µL      Eosinophils Absolute 0 38 Thousand/µL      Basophils Absolute 0 11 Thousands/µL     Blood gas, venous [[id number]]  (Abnormal) Collected: 06/02/22 0935    Lab Status: Final result Specimen: Blood from Arm, Right Updated: 06/02/22 0947     pH, Jamar 7 409     pCO2, Jamar 41 5 mm Hg      pO2, Jamar 48 9 mm Hg      HCO3, Jamar 25 7 mmol/L      Base Excess, Jamar 0 9 mmol/L      O2 Content, Jamar 20 9 ml/dL      O2 HGB, VENOUS 84 6 %                  XR chest 2 views   Final Result by Ermalinda Angelucci, MD (06/02 1033)      No acute cardiopulmonary disease  Workstation performed: [id number]                    Procedures  ECG 12 Lead Documentation Only    Date/Time: 6/2/2022 10:30 AM  Performed by: Nicolás Guerrero DO  Authorized by: Nicolás Guerrero DO     Indications / Diagnosis:  Muscle spasm   Patient location:  ED  Previous ECG:     Previous ECG:  Unavailable  Rate:     ECG rate:  99    ECG rate assessment: normal    Rhythm:     Rhythm: sinus rhythm    Ectopy:     Ectopy: none    QRS:     QRS axis:  Normal    QRS intervals:  Normal  ST segments:     ST segments:  Non-specific  T waves:     T waves: normal               ED Course  ED Course as of 06/02/22 1159   u Jun 02, 2022   1121 Patient states that she has to go in 10 mins due to her  having radiation in [facility] in 1 hr  She no longer wants to wait for her results  Will draw 2nd trop, get ekg, and send UA  Patient instructed to increase fluid intake and follow up with her PCP within 1-2 days  SBIRT 22yo+    Flowsheet Row Most Recent Value   SBIRT (23 yo +)    In order to provide better care to our patients, we are screening all of our patients for alcohol and drug use   Would it be okay to ask you these screening questions? Yes Filed at: 06/02/2022 1013   Initial Alcohol Screen: US AUDIT-C     1  How often do you have a drink containing alcohol? 0 Filed at: 06/02/2022 1013   2  How many drinks containing alcohol do you have on a typical day you are drinking? 0 Filed at: 06/02/2022 1013   3b  FEMALE Any Age, or MALE 65+: How often do you have 4 or more drinks on one occassion? 0 Filed at: 06/02/2022 1013   Audit-C Score 0 Filed at: 06/02/2022 1013   RAMON: How many times in the past year have you    Used an illegal drug or used a prescription medication for non-medical reasons? Never Filed at: 06/02/2022 1013                    MDM  Number of Diagnoses or Management Options  Asthma exacerbation: new and requires workup  Muscle spasm: new and requires workup  Diagnosis management comments: Patient with muscle spasms- will get labs, trop/ ekg, ck, and cxr  Will give fluids and valium  Will reassess  Patient no longer wants to stay due to her  needing to be at radiation at 1230  She was instructed to follow up with her pcp and increase fluid intake  Also instructed to return immediately for any new or worsening symptoms and to follow up with her pcp within  1-2 days          Amount and/or Complexity of Data Reviewed  Clinical lab tests: ordered and reviewed  Tests in the radiology section of CPT®: ordered and reviewed  Tests in the medicine section of CPT®: ordered and reviewed  Discussion of test results with the performing providers: yes  Decide to obtain previous medical records or to obtain history from someone other than the patient: yes  Obtain history from someone other than the patient: yes  Review and summarize past medical records: yes  Discuss the patient with other providers: yes  Independent visualization of images, tracings, or specimens: yes    Patient Progress  Patient progress: improved      Disposition  Final diagnoses:   Muscle spasm   Asthma exacerbation Time reflects when diagnosis was documented in both MDM as applicable and the Disposition within this note     Time User Action Codes Description Comment    6/2/2022 11:19 AM Azam Bradford Add [M17 [id number]] Muscle spasm     6/2/2022 11:19 AM Navid CONWAY Add [Q09 [id number]] Asthma exacerbation       ED Disposition     ED Disposition   Discharge    Condition   Stable    Date/Time   Thu Jun 2, 2022 11:19 AM    Klaus Arreola discharge to home/self care  Follow-up Information     Follow up With Specialties Details Why Contact Info Additional 066 MD [facility] Call in 1 day for follow up within 2-3 days Josefina Clancy [phone]       [facility] Emergency Department Emergency Medicine Go to  immediately for any new or worsening symptoms [place] [address] [address]  [id number] [place] [address] Emergency Department, [address] [place] [address], [facility]          Patient's Medications   Discharge Prescriptions    PREDNISONE 20 MG TABLET    Take 3 tablets (60 mg total) by mouth daily for 4 days       Start Date: 6/2/2022  End Date: 6/6/2022       Order Dose: 60 mg       Quantity: 12 tablet    Refills: 0       No discharge procedures on file      PDMP Review     None          ED Provider  Electronically Signed by           Jacki Martines DO  06/02/22 0245

## 2022-06-03 LAB
ATRIAL RATE: 93 BPM
ATRIAL RATE: 99 BPM
P AXIS: 72 DEGREES
P AXIS: 75 DEGREES
PR INTERVAL: 144 MS
PR INTERVAL: 150 MS
QRS AXIS: 73 DEGREES
QRS AXIS: 73 DEGREES
QRSD INTERVAL: 78 MS
QRSD INTERVAL: 78 MS
QT INTERVAL: 358 MS
QT INTERVAL: 376 MS
QTC INTERVAL: 459 MS
QTC INTERVAL: 467 MS
T WAVE AXIS: 106 DEGREES
T WAVE AXIS: 96 DEGREES
VENTRICULAR RATE: 93 BPM
VENTRICULAR RATE: 99 BPM

## 2022-06-03 PROCEDURE — 93010 ELECTROCARDIOGRAM REPORT: CPT | Performed by: INTERNAL MEDICINE

## 2022-06-13 ENCOUNTER — APPOINTMENT (EMERGENCY)
Dept: RADIOLOGY | Facility: HOSPITAL | Age: 72
End: 2022-06-13
Payer: MEDICARE

## 2022-06-13 ENCOUNTER — HOSPITAL ENCOUNTER (EMERGENCY)
Facility: HOSPITAL | Age: 72
Discharge: HOME | End: 2022-06-13
Payer: MEDICARE

## 2022-06-13 VITALS
SYSTOLIC BLOOD PRESSURE: 190 MMHG | HEART RATE: 90 BPM | TEMPERATURE: 99.5 F | DIASTOLIC BLOOD PRESSURE: 84 MMHG | OXYGEN SATURATION: 93 % | RESPIRATION RATE: 22 BRPM

## 2022-06-13 DIAGNOSIS — J44.1 COPD WITH ACUTE EXACERBATION (HCC): Primary | ICD-10-CM

## 2022-06-13 LAB
ALBUMIN SERPL BCP-MCNC: 3.7 G/DL (ref 3.5–5)
ALP SERPL-CCNC: 74 U/L (ref 46–116)
ALT SERPL W P-5'-P-CCNC: 43 U/L (ref 12–78)
ANION GAP SERPL CALCULATED.3IONS-SCNC: 13 MMOL/L (ref 4–13)
AST SERPL W P-5'-P-CCNC: 23 U/L (ref 5–45)
ATRIAL RATE: 94 BPM
BASOPHILS # BLD AUTO: 0.1 THOUSANDS/ΜL (ref 0–0.1)
BASOPHILS NFR BLD AUTO: 1 % (ref 0–1)
BILIRUB SERPL-MCNC: 0.46 MG/DL (ref 0.2–1)
BUN SERPL-MCNC: 12 MG/DL (ref 5–25)
CALCIUM SERPL-MCNC: 9.1 MG/DL (ref 8.3–10.1)
CARDIAC TROPONIN I PNL SERPL HS: 4 NG/L
CHLORIDE SERPL-SCNC: 104 MMOL/L (ref 100–108)
CO2 SERPL-SCNC: 24 MMOL/L (ref 21–32)
CREAT SERPL-MCNC: 0.9 MG/DL (ref 0.6–1.3)
EOSINOPHIL # BLD AUTO: 0.57 THOUSAND/ΜL (ref 0–0.61)
EOSINOPHIL NFR BLD AUTO: 6 % (ref 0–6)
ERYTHROCYTE [DISTWIDTH] IN BLOOD BY AUTOMATED COUNT: 12.3 % (ref 11.6–15.1)
FLUAV RNA RESP QL NAA+PROBE: NEGATIVE
FLUBV RNA RESP QL NAA+PROBE: NEGATIVE
GFR SERPL CREATININE-BSD FRML MDRD: 64 ML/MIN/1.73SQ M
GLUCOSE SERPL-MCNC: 121 MG/DL (ref 65–140)
HCT VFR BLD AUTO: 47.5 % (ref 34.8–46.1)
HGB BLD-MCNC: 15.3 G/DL (ref 11.5–15.4)
IMM GRANULOCYTES # BLD AUTO: 0.08 THOUSAND/UL (ref 0–0.2)
IMM GRANULOCYTES NFR BLD AUTO: 1 % (ref 0–2)
LYMPHOCYTES # BLD AUTO: 2.79 THOUSANDS/ΜL (ref 0.6–4.47)
LYMPHOCYTES NFR BLD AUTO: 29 % (ref 14–44)
MCH RBC QN AUTO: 30.2 PG (ref 26.8–34.3)
MCHC RBC AUTO-ENTMCNC: 32.2 G/DL (ref 31.4–37.4)
MCV RBC AUTO: 94 FL (ref 82–98)
MONOCYTES # BLD AUTO: 0.66 THOUSAND/ΜL (ref 0.17–1.22)
MONOCYTES NFR BLD AUTO: 7 % (ref 4–12)
NEUTROPHILS # BLD AUTO: 5.52 THOUSANDS/ΜL (ref 1.85–7.62)
NEUTS SEG NFR BLD AUTO: 56 % (ref 43–75)
NRBC BLD AUTO-RTO: 0 /100 WBCS
NT-PROBNP SERPL-MCNC: 67 PG/ML
P AXIS: 81 DEGREES
PLATELET # BLD AUTO: 225 THOUSANDS/UL (ref 149–390)
PMV BLD AUTO: 9.5 FL (ref 8.9–12.7)
POTASSIUM SERPL-SCNC: 4.3 MMOL/L (ref 3.5–5.3)
PR INTERVAL: 140 MS
PROT SERPL-MCNC: 6.5 G/DL (ref 6.4–8.2)
QRS AXIS: 80 DEGREES
QRSD INTERVAL: 66 MS
QT INTERVAL: 344 MS
QTC INTERVAL: 430 MS
RBC # BLD AUTO: 5.07 MILLION/UL (ref 3.81–5.12)
RSV RNA RESP QL NAA+PROBE: NEGATIVE
SARS-COV-2 RNA RESP QL NAA+PROBE: NEGATIVE
SODIUM SERPL-SCNC: 141 MMOL/L (ref 136–145)
T WAVE AXIS: 101 DEGREES
VENTRICULAR RATE: 94 BPM
WBC # BLD AUTO: 9.72 THOUSAND/UL (ref 4.31–10.16)

## 2022-06-13 PROCEDURE — 94640 AIRWAY INHALATION TREATMENT: CPT

## 2022-06-13 PROCEDURE — 83880 ASSAY OF NATRIURETIC PEPTIDE: CPT | Performed by: NURSE PRACTITIONER

## 2022-06-13 PROCEDURE — 93010 ELECTROCARDIOGRAM REPORT: CPT | Performed by: INTERNAL MEDICINE

## 2022-06-13 PROCEDURE — 84484 ASSAY OF TROPONIN QUANT: CPT | Performed by: NURSE PRACTITIONER

## 2022-06-13 PROCEDURE — 85025 COMPLETE CBC W/AUTO DIFF WBC: CPT | Performed by: NURSE PRACTITIONER

## 2022-06-13 PROCEDURE — 99285 EMERGENCY DEPT VISIT HI MDM: CPT

## 2022-06-13 PROCEDURE — 36415 COLL VENOUS BLD VENIPUNCTURE: CPT | Performed by: NURSE PRACTITIONER

## 2022-06-13 PROCEDURE — 80053 COMPREHEN METABOLIC PANEL: CPT | Performed by: NURSE PRACTITIONER

## 2022-06-13 PROCEDURE — 96375 TX/PRO/DX INJ NEW DRUG ADDON: CPT

## 2022-06-13 PROCEDURE — 96366 THER/PROPH/DIAG IV INF ADDON: CPT

## 2022-06-13 PROCEDURE — 99284 EMERGENCY DEPT VISIT MOD MDM: CPT | Performed by: NURSE PRACTITIONER

## 2022-06-13 PROCEDURE — 96365 THER/PROPH/DIAG IV INF INIT: CPT

## 2022-06-13 PROCEDURE — 71045 X-RAY EXAM CHEST 1 VIEW: CPT

## 2022-06-13 PROCEDURE — 94760 N-INVAS EAR/PLS OXIMETRY 1: CPT

## 2022-06-13 PROCEDURE — 0241U HB NFCT DS VIR RESP RNA 4 TRGT: CPT | Performed by: NURSE PRACTITIONER

## 2022-06-13 PROCEDURE — 93005 ELECTROCARDIOGRAM TRACING: CPT

## 2022-06-13 RX ORDER — SODIUM CHLORIDE FOR INHALATION 0.9 %
3 VIAL, NEBULIZER (ML) INHALATION ONCE
Status: COMPLETED | OUTPATIENT
Start: 2022-06-13 | End: 2022-06-13

## 2022-06-13 RX ORDER — PREDNISONE 20 MG/1
TABLET ORAL
Qty: 24 TABLET | Refills: 0 | Status: SHIPPED | OUTPATIENT
Start: 2022-06-13

## 2022-06-13 RX ORDER — MAGNESIUM SULFATE HEPTAHYDRATE 40 MG/ML
2 INJECTION, SOLUTION INTRAVENOUS ONCE
Status: COMPLETED | OUTPATIENT
Start: 2022-06-13 | End: 2022-06-13

## 2022-06-13 RX ORDER — METHYLPREDNISOLONE SODIUM SUCCINATE 125 MG/2ML
125 INJECTION, POWDER, LYOPHILIZED, FOR SOLUTION INTRAMUSCULAR; INTRAVENOUS ONCE
Status: COMPLETED | OUTPATIENT
Start: 2022-06-13 | End: 2022-06-13

## 2022-06-13 RX ORDER — IPRATROPIUM BROMIDE AND ALBUTEROL SULFATE 2.5; .5 MG/3ML; MG/3ML
3 SOLUTION RESPIRATORY (INHALATION) 4 TIMES DAILY
Qty: 60 ML | Refills: 0 | Status: SHIPPED | OUTPATIENT
Start: 2022-06-13 | End: 2022-06-18

## 2022-06-13 RX ADMIN — ISODIUM CHLORIDE 3 ML: 0.03 SOLUTION RESPIRATORY (INHALATION) at 06:55

## 2022-06-13 RX ADMIN — IPRATROPIUM BROMIDE 1 MG: 0.5 SOLUTION RESPIRATORY (INHALATION) at 06:55

## 2022-06-13 RX ADMIN — ALBUTEROL SULFATE 10 MG: 2.5 SOLUTION RESPIRATORY (INHALATION) at 06:55

## 2022-06-13 RX ADMIN — METHYLPREDNISOLONE SODIUM SUCCINATE 125 MG: 125 INJECTION, POWDER, FOR SOLUTION INTRAMUSCULAR; INTRAVENOUS at 07:20

## 2022-06-13 RX ADMIN — METOPROLOL TARTRATE 25 MG: 25 TABLET, FILM COATED ORAL at 08:55

## 2022-06-13 RX ADMIN — MAGNESIUM SULFATE HEPTAHYDRATE 2 G: 40 INJECTION, SOLUTION INTRAVENOUS at 07:19

## 2022-06-13 NOTE — ED PROVIDER NOTES
History  Chief Complaint   Patient presents with    Shortness of Breath     Pt arrived ambulatory with c/o sob, hx of asthma  Pt has been using her nebulizers since yesterday  Pt states she is not improving, just holding steady     19-year-old female presents here with a chief complaint of COPD exacerbation  She reports that she is very susceptible to environmental changes a believes this is the source of her exacerbation  She denies any fever chills she used albuterol nebulizers at home without relief  She is noted to have moderate accessory muscle use on initial arrival and appears to be in moderate distress          Prior to Admission Medications   Prescriptions Last Dose Informant Patient Reported? Taking? albuterol (ACCUNEB) 1 25 MG/3ML nebulizer solution   No No   Sig: Take 3 mL (1 25 mg total) by nebulization every 6 (six) hours as needed for wheezing or shortness of breath   azithromycin (ZITHROMAX) 250 mg tablet   No No   Sig: Take 500 mg day 1, 250 mg days 2-5   predniSONE 10 mg tablet   No No   Sig: Take 1 tablet (10 mg total) by mouth daily 6 tabs for 3 days and then decrease by one tab every 3 days until complete   predniSONE 20 mg tablet   No No   Sig: Take 60 mg by mouth daily ×4 days, 40 mg by mouth daily ×4 days, 20 mg by mouth daily ×4 days      Facility-Administered Medications: None       Past Medical History:   Diagnosis Date    Asthma     Hypertension        Past Surgical History:   Procedure Laterality Date    APPENDECTOMY         History reviewed  No pertinent family history  I have reviewed and agree with the history as documented  E-Cigarette/Vaping     E-Cigarette/Vaping Substances     Social History     Tobacco Use    Smoking status: Former Smoker    Smokeless tobacco: Never Used   Substance Use Topics    Alcohol use: No    Drug use: No       Review of Systems   Constitutional: Negative for diaphoresis, fatigue and fever     HENT: Negative for congestion, ear pain, nosebleeds and sore throat  Eyes: Negative for photophobia, pain, discharge and visual disturbance  Respiratory: Positive for cough, shortness of breath and wheezing  Negative for choking and chest tightness  Cardiovascular: Negative for chest pain and palpitations  Gastrointestinal: Negative for abdominal distention, abdominal pain, diarrhea and vomiting  Genitourinary: Negative for dysuria, flank pain and frequency  Musculoskeletal: Negative for back pain, gait problem and joint swelling  Skin: Negative for color change and rash  Neurological: Negative for dizziness, syncope and headaches  Psychiatric/Behavioral: Negative for behavioral problems and confusion  The patient is not nervous/anxious  All other systems reviewed and are negative  Physical Exam  Physical Exam  Vitals and nursing note reviewed  Constitutional:       General: She is not in acute distress  Appearance: She is well-developed  She is not ill-appearing or toxic-appearing  HENT:      Head: Normocephalic and atraumatic  Mouth/Throat:      Dentition: Normal dentition  Eyes:      General:         Right eye: No discharge  Left eye: No discharge  Cardiovascular:      Rate and Rhythm: Normal rate and regular rhythm  Pulmonary:      Effort: Tachypnea, accessory muscle usage and respiratory distress present  Breath sounds: Wheezing present  Abdominal:      General: There is no distension  Tenderness: There is no guarding  Musculoskeletal:         General: Normal range of motion  Cervical back: Normal range of motion and neck supple  Skin:     General: Skin is warm and dry  Neurological:      Mental Status: She is alert and oriented to person, place, and time  Coordination: Coordination normal    Psychiatric:         Behavior: Behavior is cooperative           Vital Signs  ED Triage Vitals [06/13/22 0638]   Temperature Pulse Respirations Blood Pressure SpO2   99 5 °F (37 5 °C) 100 22 (!) 188/100 93 %      Temp Source Heart Rate Source Patient Position - Orthostatic VS BP Location FiO2 (%)   Tympanic Monitor Sitting Left arm --      Pain Score       No Pain           Vitals:    06/13/22 0638 06/13/22 0830 06/13/22 0855   BP: (!) 188/100 (!) 233/107 (!) 233/107   Pulse: 100 93 90   Patient Position - Orthostatic VS: Sitting           Visual Acuity      ED Medications  Medications   albuterol inhalation solution 10 mg (10 mg Nebulization Given 6/13/22 0655)     And   ipratropium (ATROVENT) 0 02 % inhalation solution 1 mg (1 mg Nebulization Given 6/13/22 0655)     And   sodium chloride 0 9 % inhalation solution 3 mL (3 mL Nebulization Given 6/13/22 0655)   magnesium sulfate 2 g/50 mL IVPB (premix) 2 g (0 g Intravenous Stopped 6/13/22 0856)   methylPREDNISolone sodium succinate (Solu-MEDROL) injection 125 mg (125 mg Intravenous Given 6/13/22 0720)   metoprolol tartrate (LOPRESSOR) tablet 25 mg (25 mg Oral Given 6/13/22 0855)       Diagnostic Studies  Results Reviewed     Procedure Component Value Units Date/Time    HS Troponin I 4hr [[id number]]     Lab Status: No result Specimen: Blood     HS Troponin 0hr (reflex protocol) [[id number]]  (Normal) Collected: 06/13/22 0716    Lab Status: Final result Specimen: Blood from Arm, Left Updated: 06/13/22 0755     hs TnI 0hr 4 ng/L     HS Troponin I 2hr [[id number]]     Lab Status: No result Specimen: Blood     NT-BNP PRO [[id number]]  (Normal) Collected: 06/13/22 0716    Lab Status: Final result Specimen: Blood from Arm, Left Updated: 06/13/22 0745     NT-proBNP 67 pg/mL     Comprehensive metabolic panel [[id number]] Collected: 06/13/22 0716    Lab Status: Final result Specimen: Blood from Arm, Left Updated: 06/13/22 0737     Sodium 141 mmol/L      Potassium 4 3 mmol/L      Chloride 104 mmol/L      CO2 24 mmol/L      ANION GAP 13 mmol/L      BUN 12 mg/dL      Creatinine 0 90 mg/dL      Glucose 121 mg/dL      Calcium 9 1 mg/dL      AST 23 U/L      ALT 43 U/L      Alkaline Phosphatase 74 U/L      Total Protein 6 5 g/dL      Albumin 3 7 g/dL      Total Bilirubin 0 46 mg/dL      eGFR 64 ml/min/1 73sq m     Narrative:      [place] guidelines for Chronic Kidney Disease (CKD):     Stage 1 with normal or high GFR (GFR > 90 mL/min/1 73 square meters)    Stage 2 Mild CKD (GFR = 60-89 mL/min/1 73 square meters)    Stage 3A Moderate CKD (GFR = 45-59 mL/min/1 73 square meters)    Stage 3B Moderate CKD (GFR = 30-44 mL/min/1 73 square meters)    Stage 4 Severe CKD (GFR = 15-29 mL/min/1 73 square meters)    Stage 5 End Stage CKD (GFR <15 mL/min/1 73 square meters)  Note: GFR calculation is accurate only with a steady state creatinine    COVID/FLU/RSV - 2 hour TAT [[id number]]  (Normal) Collected: 06/13/22 0653    Lab Status: Final result Specimen: Nares from Nose Updated: 06/13/22 0735     SARS-CoV-2 Negative     INFLUENZA A PCR Negative     INFLUENZA B PCR Negative     RSV PCR Negative    Narrative:      FOR PEDIATRIC PATIENTS - copy/paste COVID Guidelines URL to browser: [email or web address]    SARS-CoV-2 assay is a Nucleic Acid Amplification assay intended for the  qualitative detection of nucleic acid from SARS-CoV-2 in nasopharyngeal  swabs  Results are for the presumptive identification of SARS-CoV-2 RNA  Positive results are indicative of infection with SARS-CoV-2, the virus  causing COVID-19, but do not rule out bacterial infection or co-infection  with other viruses  Laboratories within the [place] and its  territories are required to report all positive results to the appropriate  public health authorities  Negative results do not preclude SARS-CoV-2  infection and should not be used as the sole basis for treatment or other  patient management decisions  Negative results must be combined with  clinical observations, patient history, and epidemiological information    This test has not been FDA cleared or approved  This test has been authorized by FDA under an Emergency Use Authorization  (EUA)  This test is only authorized for the duration of time the  declaration that circumstances exist justifying the authorization of the  emergency use of an in vitro diagnostic tests for detection of SARS-CoV-2  virus and/or diagnosis of COVID-19 infection under section 564(b)(1) of  the Act, 21 U  S C  950KKS-6(C)(4), unless the authorization is terminated  or revoked sooner  The test has been validated but independent review by FDA  and CLIA is pending  Test performed using [employer] GeneXpert: This RT-PCR assay targets N2,  a region unique to SARS-CoV-2  A conserved region in the E-gene was chosen  for pan-Sarbecovirus detection which includes SARS-CoV-2  CBC and differential [[id number]]  (Abnormal) Collected: 06/13/22 0716    Lab Status: Final result Specimen: Blood from Arm, Left Updated: 06/13/22 0720     WBC 9 72 Thousand/uL      RBC 5 07 Million/uL      Hemoglobin 15 3 g/dL      Hematocrit 47 5 %      MCV 94 fL      MCH 30 2 pg      MCHC 32 2 g/dL      RDW 12 3 %      MPV 9 5 fL      Platelets 824 Thousands/uL      nRBC 0 /100 WBCs      Neutrophils Relative 56 %      Immat GRANS % 1 %      Lymphocytes Relative 29 %      Monocytes Relative 7 %      Eosinophils Relative 6 %      Basophils Relative 1 %      Neutrophils Absolute 5 52 Thousands/µL      Immature Grans Absolute 0 08 Thousand/uL      Lymphocytes Absolute 2 79 Thousands/µL      Monocytes Absolute 0 66 Thousand/µL      Eosinophils Absolute 0 57 Thousand/µL      Basophils Absolute 0 10 Thousands/µL                  XR chest 1 view portable   Final Result by Ernesto Roach MD (06/13 1571)      No acute cardiopulmonary disease                    Workstation performed: [id number]                    Procedures  Procedures         ED Course                               SBIRT 20yo+    Flowsheet Row Most Recent Value   SBIRT (21 yo +)    In order to provide better care to our patients, we are screening all of our patients for alcohol and drug use  Would it be okay to ask you these screening questions? No Filed at: 06/13/2022 0657                    MDM  Number of Diagnoses or Management Options  COPD with acute exacerbation [facility]): new and requires workup  Diagnosis management comments: Patient feels significantly better after her hour long breathing treatment and magnesium administration  She feels comfortable to go home  Will continue prednisone therapy and prescribed DuoNeb       Amount and/or Complexity of Data Reviewed  Clinical lab tests: ordered and reviewed  Tests in the radiology section of CPT®: ordered and reviewed  Independent visualization of images, tracings, or specimens: yes    Patient Progress  Patient progress: stable      Disposition  Final diagnoses:   COPD with acute exacerbation ([address] )     Time reflects when diagnosis was documented in both MDM as applicable and the Disposition within this note     Time User Action Codes Description Comment    6/13/2022  8:56 AM Bobby Gilmore Add [J44 1] COPD with acute exacerbation [facility])       ED Disposition     ED Disposition   Discharge    Condition   Stable    Date/Time   Mon Jun 13, 2022  8:56 AM    Comment   Benjamin Portillo discharge to home/self care  Follow-up Information    None         Patient's Medications   Discharge Prescriptions    IPRATROPIUM-ALBUTEROL (DUO-NEB) 0 5-2 5 MG/3 ML NEBULIZER SOLUTION    Take 3 mL by nebulization 4 (four) times a day for 5 days       Start Date: 6/13/2022 End Date: 6/18/2022       Order Dose: 3 mL       Quantity: 60 mL    Refills: 0    PREDNISONE 20 MG TABLET    Take 60 mg by mouth daily 4 days, 40 mg by mouth daily 4 days, 20 mg by mouth daily 4 days       Start Date: 6/13/2022 End Date: --       Order Dose: --       Quantity: 24 tablet    Refills: 0       No discharge procedures on file      PDMP Review     None          ED Provider  Electronically Signed by           LENO Iniguez  06/13/22 1426

## 2022-09-15 ENCOUNTER — APPOINTMENT (OUTPATIENT)
Dept: RADIOLOGY | Facility: HOSPITAL | Age: 72
End: 2022-09-15
Payer: MEDICARE

## 2022-09-15 ENCOUNTER — HOSPITAL ENCOUNTER (EMERGENCY)
Facility: HOSPITAL | Age: 72
Discharge: HOME | End: 2022-09-15
Attending: EMERGENCY MEDICINE
Payer: MEDICARE

## 2022-09-15 VITALS
OXYGEN SATURATION: 93 % | SYSTOLIC BLOOD PRESSURE: 151 MMHG | HEART RATE: 91 BPM | RESPIRATION RATE: 20 BRPM | TEMPERATURE: 97.8 F | HEIGHT: 68 IN | BODY MASS INDEX: 26.52 KG/M2 | WEIGHT: 175 LBS | DIASTOLIC BLOOD PRESSURE: 84 MMHG

## 2022-09-15 DIAGNOSIS — J45.901 ASTHMA EXACERBATION: Primary | ICD-10-CM

## 2022-09-15 LAB
2HR DELTA HS TROPONIN: -2 NG/L
ALBUMIN SERPL BCP-MCNC: 3.9 G/DL (ref 3.5–5)
ALP SERPL-CCNC: 77 U/L (ref 46–116)
ALT SERPL W P-5'-P-CCNC: 43 U/L (ref 12–78)
ANION GAP SERPL CALCULATED.3IONS-SCNC: 12 MMOL/L (ref 4–13)
AST SERPL W P-5'-P-CCNC: 24 U/L (ref 5–45)
ATRIAL RATE: 91 BPM
BASOPHILS # BLD AUTO: 0.11 THOUSANDS/ΜL (ref 0–0.1)
BASOPHILS NFR BLD AUTO: 1 % (ref 0–1)
BILIRUB SERPL-MCNC: 0.66 MG/DL (ref 0.2–1)
BUN SERPL-MCNC: 10 MG/DL (ref 5–25)
CALCIUM SERPL-MCNC: 8.8 MG/DL (ref 8.3–10.1)
CARDIAC TROPONIN I PNL SERPL HS: 4 NG/L
CARDIAC TROPONIN I PNL SERPL HS: 6 NG/L
CHLORIDE SERPL-SCNC: 106 MMOL/L (ref 96–108)
CO2 SERPL-SCNC: 24 MMOL/L (ref 21–32)
CREAT SERPL-MCNC: 0.84 MG/DL (ref 0.6–1.3)
EOSINOPHIL # BLD AUTO: 0.68 THOUSAND/ΜL (ref 0–0.61)
EOSINOPHIL NFR BLD AUTO: 8 % (ref 0–6)
ERYTHROCYTE [DISTWIDTH] IN BLOOD BY AUTOMATED COUNT: 11.9 % (ref 11.6–15.1)
FLUAV RNA RESP QL NAA+PROBE: NEGATIVE
FLUBV RNA RESP QL NAA+PROBE: NEGATIVE
GFR SERPL CREATININE-BSD FRML MDRD: 69 ML/MIN/1.73SQ M
GLUCOSE SERPL-MCNC: 107 MG/DL (ref 65–140)
HCT VFR BLD AUTO: 45.6 % (ref 34.8–46.1)
HGB BLD-MCNC: 15.2 G/DL (ref 11.5–15.4)
IMM GRANULOCYTES # BLD AUTO: 0.04 THOUSAND/UL (ref 0–0.2)
IMM GRANULOCYTES NFR BLD AUTO: 1 % (ref 0–2)
LYMPHOCYTES # BLD AUTO: 1.95 THOUSANDS/ΜL (ref 0.6–4.47)
LYMPHOCYTES NFR BLD AUTO: 22 % (ref 14–44)
MCH RBC QN AUTO: 31.1 PG (ref 26.8–34.3)
MCHC RBC AUTO-ENTMCNC: 33.3 G/DL (ref 31.4–37.4)
MCV RBC AUTO: 93 FL (ref 82–98)
MONOCYTES # BLD AUTO: 0.65 THOUSAND/ΜL (ref 0.17–1.22)
MONOCYTES NFR BLD AUTO: 7 % (ref 4–12)
NEUTROPHILS # BLD AUTO: 5.31 THOUSANDS/ΜL (ref 1.85–7.62)
NEUTS SEG NFR BLD AUTO: 61 % (ref 43–75)
NRBC BLD AUTO-RTO: 0 /100 WBCS
NT-PROBNP SERPL-MCNC: 55 PG/ML
P AXIS: 82 DEGREES
PLATELET # BLD AUTO: 223 THOUSANDS/UL (ref 149–390)
PMV BLD AUTO: 9.8 FL (ref 8.9–12.7)
POTASSIUM SERPL-SCNC: 4.5 MMOL/L (ref 3.5–5.3)
PR INTERVAL: 136 MS
PROT SERPL-MCNC: 6.8 G/DL (ref 6.4–8.4)
QRS AXIS: 81 DEGREES
QRSD INTERVAL: 66 MS
QT INTERVAL: 370 MS
QTC INTERVAL: 455 MS
RBC # BLD AUTO: 4.88 MILLION/UL (ref 3.81–5.12)
RSV RNA RESP QL NAA+PROBE: NEGATIVE
SARS-COV-2 RNA RESP QL NAA+PROBE: NEGATIVE
SODIUM SERPL-SCNC: 142 MMOL/L (ref 135–147)
T WAVE AXIS: 114 DEGREES
VENTRICULAR RATE: 91 BPM
WBC # BLD AUTO: 8.74 THOUSAND/UL (ref 4.31–10.16)

## 2022-09-15 PROCEDURE — 94640 AIRWAY INHALATION TREATMENT: CPT

## 2022-09-15 PROCEDURE — 85025 COMPLETE CBC W/AUTO DIFF WBC: CPT | Performed by: EMERGENCY MEDICINE

## 2022-09-15 PROCEDURE — 80053 COMPREHEN METABOLIC PANEL: CPT | Performed by: EMERGENCY MEDICINE

## 2022-09-15 PROCEDURE — 96375 TX/PRO/DX INJ NEW DRUG ADDON: CPT

## 2022-09-15 PROCEDURE — 94760 N-INVAS EAR/PLS OXIMETRY 1: CPT

## 2022-09-15 PROCEDURE — 99291 CRITICAL CARE FIRST HOUR: CPT | Performed by: EMERGENCY MEDICINE

## 2022-09-15 PROCEDURE — 71046 X-RAY EXAM CHEST 2 VIEWS: CPT

## 2022-09-15 PROCEDURE — 0241U HB NFCT DS VIR RESP RNA 4 TRGT: CPT | Performed by: EMERGENCY MEDICINE

## 2022-09-15 PROCEDURE — 36415 COLL VENOUS BLD VENIPUNCTURE: CPT | Performed by: EMERGENCY MEDICINE

## 2022-09-15 PROCEDURE — 93010 ELECTROCARDIOGRAM REPORT: CPT | Performed by: INTERNAL MEDICINE

## 2022-09-15 PROCEDURE — 99284 EMERGENCY DEPT VISIT MOD MDM: CPT

## 2022-09-15 PROCEDURE — 93005 ELECTROCARDIOGRAM TRACING: CPT

## 2022-09-15 PROCEDURE — 84484 ASSAY OF TROPONIN QUANT: CPT | Performed by: EMERGENCY MEDICINE

## 2022-09-15 PROCEDURE — 96365 THER/PROPH/DIAG IV INF INIT: CPT

## 2022-09-15 PROCEDURE — 83880 ASSAY OF NATRIURETIC PEPTIDE: CPT | Performed by: EMERGENCY MEDICINE

## 2022-09-15 RX ORDER — ALBUTEROL SULFATE 2.5 MG/3ML
5 SOLUTION RESPIRATORY (INHALATION) ONCE
Status: COMPLETED | OUTPATIENT
Start: 2022-09-15 | End: 2022-09-15

## 2022-09-15 RX ORDER — IPRATROPIUM BROMIDE AND ALBUTEROL SULFATE 2.5; .5 MG/3ML; MG/3ML
3 SOLUTION RESPIRATORY (INHALATION) 4 TIMES DAILY
Qty: 360 ML | Refills: 0 | Status: SHIPPED | OUTPATIENT
Start: 2022-09-15 | End: 2022-10-15

## 2022-09-15 RX ORDER — PREDNISONE 20 MG/1
TABLET ORAL
Qty: 18 TABLET | Refills: 0 | Status: SHIPPED | OUTPATIENT
Start: 2022-09-15

## 2022-09-15 RX ORDER — MAGNESIUM SULFATE HEPTAHYDRATE 40 MG/ML
2 INJECTION, SOLUTION INTRAVENOUS ONCE
Status: COMPLETED | OUTPATIENT
Start: 2022-09-15 | End: 2022-09-15

## 2022-09-15 RX ORDER — SODIUM CHLORIDE FOR INHALATION 0.9 %
3 VIAL, NEBULIZER (ML) INHALATION ONCE
Status: COMPLETED | OUTPATIENT
Start: 2022-09-15 | End: 2022-09-15

## 2022-09-15 RX ORDER — DEXAMETHASONE SODIUM PHOSPHATE 10 MG/ML
10 INJECTION, SOLUTION INTRAMUSCULAR; INTRAVENOUS ONCE
Status: COMPLETED | OUTPATIENT
Start: 2022-09-15 | End: 2022-09-15

## 2022-09-15 RX ADMIN — MAGNESIUM SULFATE HEPTAHYDRATE 2 G: 40 INJECTION, SOLUTION INTRAVENOUS at 08:33

## 2022-09-15 RX ADMIN — DEXAMETHASONE SODIUM PHOSPHATE 10 MG: 10 INJECTION INTRAMUSCULAR; INTRAVENOUS at 08:33

## 2022-09-15 RX ADMIN — IPRATROPIUM BROMIDE 1 MG: 0.5 SOLUTION RESPIRATORY (INHALATION) at 08:53

## 2022-09-15 RX ADMIN — ALBUTEROL SULFATE 5 MG: 2.5 SOLUTION RESPIRATORY (INHALATION) at 10:40

## 2022-09-15 RX ADMIN — ISODIUM CHLORIDE 3 ML: 0.03 SOLUTION RESPIRATORY (INHALATION) at 08:53

## 2022-09-15 RX ADMIN — ALBUTEROL SULFATE 10 MG: 2.5 SOLUTION RESPIRATORY (INHALATION) at 08:53

## 2022-09-15 NOTE — ED PROVIDER NOTES
History  Chief Complaint   Patient presents with    Asthma     Patient states "my asthma has been acting up and I have been using my home nebs but I cant seem to get it under control " Patient reports started 5-6 days ago  Patient also reports congestion  Patient states " I got the covid booster on Monday and it seem to worsen after that "      Patient is a 68-year-old female past medical history of asthma, hypertension presenting with shortness of breath  Patient states that over the last 5-6 days she has had constant shortness of breath which is worse with exertion minimally relieved by her nebulizer breathing treatments but states that the relief does not last   She notes left-sided chest heaviness which is constant, nonradiating, not exertional in nature worsening over the last 2 days  She notes dry cough for 2 days  Denies any fevers, leg swelling, respiratory symptoms, rashes, dysuria, nausea vomiting, dizziness, vision changes  Notes compliance with her medications and has been intubated 3 times the past relative to her asthma  Does not carry diagnosis of heart failure  Prior to Admission Medications   Prescriptions Last Dose Informant Patient Reported? Taking?    albuterol (ACCUNEB) 1 25 MG/3ML nebulizer solution   No No   Sig: Take 3 mL (1 25 mg total) by nebulization every 6 (six) hours as needed for wheezing or shortness of breath   azithromycin (ZITHROMAX) 250 mg tablet   No No   Sig: Take 500 mg day 1, 250 mg days 2-5   predniSONE 10 mg tablet   No No   Sig: Take 1 tablet (10 mg total) by mouth daily 6 tabs for 3 days and then decrease by one tab every 3 days until complete   predniSONE 20 mg tablet   No No   Sig: Take 60 mg by mouth daily ×4 days, 40 mg by mouth daily ×4 days, 20 mg by mouth daily ×4 days   predniSONE 20 mg tablet   No No   Sig: Take 60 mg by mouth daily 4 days, 40 mg by mouth daily 4 days, 20 mg by mouth daily 4 days      Facility-Administered Medications: None Past Medical History:   Diagnosis Date    Asthma     Hypertension        Past Surgical History:   Procedure Laterality Date    APPENDECTOMY         History reviewed  No pertinent family history  I have reviewed and agree with the history as documented  E-Cigarette/Vaping     E-Cigarette/Vaping Substances     Social History     Tobacco Use    Smoking status: Former Smoker    Smokeless tobacco: Never Used   Substance Use Topics    Alcohol use: No    Drug use: No       Review of Systems   All other systems reviewed and are negative  Physical Exam  Physical Exam  Vitals reviewed  Constitutional:       General: She is not in acute distress  Appearance: Normal appearance  She is not ill-appearing  HENT:      Mouth/Throat:      Mouth: Mucous membranes are dry  Eyes:      Conjunctiva/sclera: Conjunctivae normal    Cardiovascular:      Rate and Rhythm: Normal rate and regular rhythm  Pulses: Normal pulses  Heart sounds: Normal heart sounds  Pulmonary:      Breath sounds: Wheezing present  Comments: Diffuse inspiratory and expiratory wheezing in all lung fields, no retractions, mild conversational dyspnea  Abdominal:      General: Abdomen is flat  Palpations: Abdomen is soft  Tenderness: There is no abdominal tenderness  Musculoskeletal:         General: No swelling  Normal range of motion  Cervical back: Neck supple  Left lower leg: No edema  Skin:     General: Skin is warm and dry  Neurological:      General: No focal deficit present  Mental Status: She is alert     Psychiatric:         Mood and Affect: Mood normal          Vital Signs  ED Triage Vitals   Temperature Pulse Respirations Blood Pressure SpO2   09/15/22 0801 09/15/22 0801 09/15/22 0801 09/15/22 0801 09/15/22 0801   97 8 °F (36 6 °C) 89 22 (!) 203/89 94 %      Temp Source Heart Rate Source Patient Position - Orthostatic VS BP Location FiO2 (%)   09/15/22 0801 09/15/22 0801 09/15/22 0801 09/15/22 1000 --   Oral Monitor Sitting Right arm       Pain Score       --                  Vitals:    09/15/22 0801 09/15/22 0821 09/15/22 1000   BP: (!) 203/89 144/71 151/84   Pulse: 89  91   Patient Position - Orthostatic VS: Sitting  Sitting         Visual Acuity  Visual Acuity    Flowsheet Row Most Recent Value   L Pupil Size (mm) 3   R Pupil Size (mm) 3          ED Medications  Medications   albuterol inhalation solution 10 mg (10 mg Nebulization Given 9/15/22 0853)     And   ipratropium (ATROVENT) 0 02 % inhalation solution 1 mg (1 mg Nebulization Given 9/15/22 0853)     And   sodium chloride 0 9 % inhalation solution 3 mL (3 mL Nebulization Given 9/15/22 0853)   dexamethasone (PF) (DECADRON) injection 10 mg (10 mg Intravenous Given 9/15/22 7008)   magnesium sulfate 2 g/50 mL IVPB (premix) 2 g (0 g Intravenous Stopped 9/15/22 0948)   albuterol inhalation solution 5 mg (5 mg Nebulization Given 9/15/22 1040)       Diagnostic Studies  Results Reviewed     Procedure Component Value Units Date/Time    FLU/RSV/COVID - if FLU/RSV clinically relevant [[id number]]  (Normal) Collected: 09/15/22 1125    Lab Status: Final result Specimen: Nares from Nose Updated: 09/15/22 1214     SARS-CoV-2 Negative     INFLUENZA A PCR Negative     INFLUENZA B PCR Negative     RSV PCR Negative    Narrative:      FOR PEDIATRIC PATIENTS - copy/paste COVID Guidelines URL to browser: [email or web address]  [employer]    SARS-CoV-2 assay is a Nucleic Acid Amplification assay intended for the  qualitative detection of nucleic acid from SARS-CoV-2 in nasopharyngeal  swabs  Results are for the presumptive identification of SARS-CoV-2 RNA  Positive results are indicative of infection with SARS-CoV-2, the virus  causing COVID-19, but do not rule out bacterial infection or co-infection  with other viruses   Laboratories within the [place] and its  territories are required to report all positive results to the appropriate  public health authorities  Negative results do not preclude SARS-CoV-2  infection and should not be used as the sole basis for treatment or other  patient management decisions  Negative results must be combined with  clinical observations, patient history, and epidemiological information  This test has not been FDA cleared or approved  This test has been authorized by FDA under an Emergency Use Authorization  (EUA)  This test is only authorized for the duration of time the  declaration that circumstances exist justifying the authorization of the  emergency use of an in vitro diagnostic tests for detection of SARS-CoV-2  virus and/or diagnosis of COVID-19 infection under section 564(b)(1) of  the Act, 21 U  S C  318REA-9(O)(4), unless the authorization is terminated  or revoked sooner  The test has been validated but independent review by FDA  and CLIA is pending  Test performed using [employer] Gene[employer]pert: This RT-PCR assay targets N2,  a region unique to SARS-CoV-2  A conserved region in the E-gene was chosen  for pan-Sarbecovirus detection which includes SARS-CoV-2      HS Troponin I 2hr [[id number]]  (Normal) Collected: 09/15/22 1047    Lab Status: Final result Specimen: Blood from Arm, Right Updated: 09/15/22 1120     hs TnI 2hr 4 ng/L      Delta 2hr hsTnI -2 ng/L     NT-BNP PRO [[id number]]  (Normal) Collected: 09/15/22 0839    Lab Status: Final result Specimen: Blood from Hand, Right Updated: 09/15/22 0950     NT-proBNP 55 pg/mL     HS Troponin 0hr (reflex protocol) [[id number]]  (Normal) Collected: 09/15/22 0839    Lab Status: Final result Specimen: Blood from Hand, Right Updated: 09/15/22 0950     hs TnI 0hr 6 ng/L     Comprehensive metabolic panel [[id number]] Collected: 09/15/22 0839    Lab Status: Final result Specimen: Blood from Hand, Right Updated: 09/15/22 0941     Sodium 142 mmol/L      Potassium 4 5 mmol/L      Chloride 106 mmol/L      CO2 24 mmol/L      ANION GAP 12 mmol/L BUN 10 mg/dL      Creatinine 0 84 mg/dL      Glucose 107 mg/dL      Calcium 8 8 mg/dL      AST 24 U/L      ALT 43 U/L      Alkaline Phosphatase 77 U/L      Total Protein 6 8 g/dL      Albumin 3 9 g/dL      Total Bilirubin 0 66 mg/dL      eGFR 69 ml/min/1 73sq m     Narrative:      [place] guidelines for Chronic Kidney Disease (CKD):     Stage 1 with normal or high GFR (GFR > 90 mL/min/1 73 square meters)    Stage 2 Mild CKD (GFR = 60-89 mL/min/1 73 square meters)    Stage 3A Moderate CKD (GFR = 45-59 mL/min/1 73 square meters)    Stage 3B Moderate CKD (GFR = 30-44 mL/min/1 73 square meters)    Stage 4 Severe CKD (GFR = 15-29 mL/min/1 73 square meters)    Stage 5 End Stage CKD (GFR <15 mL/min/1 73 square meters)  Note: GFR calculation is accurate only with a steady state creatinine    CBC and differential [[id number]]  (Abnormal) Collected: 09/15/22 0839    Lab Status: Final result Specimen: Blood from Hand, Right Updated: 09/15/22 0904     WBC 8 74 Thousand/uL      RBC 4 88 Million/uL      Hemoglobin 15 2 g/dL      Hematocrit 45 6 %      MCV 93 fL      MCH 31 1 pg      MCHC 33 3 g/dL      RDW 11 9 %      MPV 9 8 fL      Platelets 657 Thousands/uL      nRBC 0 /100 WBCs      Neutrophils Relative 61 %      Immat GRANS % 1 %      Lymphocytes Relative 22 %      Monocytes Relative 7 %      Eosinophils Relative 8 %      Basophils Relative 1 %      Neutrophils Absolute 5 31 Thousands/µL      Immature Grans Absolute 0 04 Thousand/uL      Lymphocytes Absolute 1 95 Thousands/µL      Monocytes Absolute 0 65 Thousand/µL      Eosinophils Absolute 0 68 Thousand/µL      Basophils Absolute 0 11 Thousands/µL                  XR chest 2 views   Final Result by Queta Rivera MD (09/15 5672)      No acute cardiopulmonary disease                    Workstation performed: [id number]                    Procedures  ECG 12 Lead Documentation Only    Date/Time: 9/15/2022 8:24 AM  Performed by: Nakia Billings DO [place]  Authorized by: Rosa Warner DO     ECG reviewed by me, the ED Provider: yes    Patient location:  ED  Previous ECG:     Previous ECG:  Compared to current    Similarity:  No change  Interpretation:     Interpretation: normal    Rate:     ECG rate assessment: normal    Rhythm:     Rhythm: sinus rhythm    Ectopy:     Ectopy: none    QRS:     QRS axis:  Normal    QRS intervals:  Normal  Conduction:     Conduction: normal    ST segments:     ST segments:  Normal  T waves:     T waves: normal      CriticalCare Time  Performed by: Rosa Warner DO  Authorized by: Rosa Warner DO     Critical care provider statement:     Critical care time (minutes):  30    Critical care was necessary to treat or prevent imminent or life-threatening deterioration of the following conditions:  Respiratory failure    Critical care was time spent personally by me on the following activities:  Obtaining history from patient or surrogate, development of treatment plan with patient or surrogate, evaluation of patient's response to treatment, examination of patient, interpretation of cardiac output measurements, ordering and performing treatments and interventions, ordering and review of radiographic studies, ordering and review of laboratory studies, re-evaluation of patient's condition, review of old charts and discussions with consultants             ED Course  ED Course as of 09/15/22 1536   Thu Sep 15, 2022   0954 Patient mildly improved after nebulizer breathing treatments, still completing them,   1124 Delta troponin unremarkable, still obtaining COVID testing, patient with improved lung exam however still with expiratory wheezing, states that she feels comfortable going home at this time, will obtain ambulatory pulse ox  Patient states that she is normally placed on long steroid taper after being in the hospital for asthma exacerbations, will discuss with pulmonology     1220 Have discussed with pulmonology who has given recommendations for steroid taper, patient tolerated walking pulse ox, COVID negative, will discharge with steroid taper, outpatient pulmonology follow-up and have extensively discussed return precautions with patient states she understands  MDM  Number of Diagnoses or Management Options  Diagnosis management comments: Patient is a 77-year-old female past medical history of hypertension asthma presenting with shortness of breath  Patient is mentating appropriately with mild conversational dyspnea, no retractions, diffuse inspiratory and expiratory wheezing with no lower extremity edema, equal pulses and no other significant physical exam findings  Suspect asthma exacerbation versus new onset CHF, patient initially hypertensive but improved with no intervention  Will give nebulizer breathing treatments, steroids, magnesium given patient's history and obtain cardiac workup  Disposition  Final diagnoses:   Asthma exacerbation     Time reflects when diagnosis was documented in both MDM as applicable and the Disposition within this note     Time User Action Codes Description Comment    9/15/2022 12:20 PM Lucien Fowler Add [L17 [id number]] Asthma exacerbation       ED Disposition     ED Disposition   Discharge    Condition   Stable    Date/Time   Thu Sep 15, 2022 12:20 PM    Comment   Zoran Crane discharge to home/self care  Follow-up Information     Follow up With Specialties Details Why Missael Boyd MD Pulmonology, Neurology, Pulmonary Disease, Sleep Medicine Schedule an appointment as soon as possible for a visit   [phone] [address] [facility] [address]  Shanon Gonzalez MD Internal Medicine In 1 week  [address]  [phone]            Discharge Medication List as of 9/15/2022 12:23 PM      START taking these medications    Details ipratropium-albuterol (DUO-NEB) 0 5-2 5 mg/3 mL nebulizer solution Take 3 mL by nebulization 4 (four) times a day, Starting u 9/15/2022, Until Sat 10/15/2022, Normal      !! predniSONE 20 mg tablet Take 40 mg (2 pills) for 5 days, 20 mg (1 pill) for 5 days, and 10 mg (1/2 pill) for 5 days, Normal       !! - Potential duplicate medications found  Please discuss with provider  CONTINUE these medications which have NOT CHANGED    Details   albuterol (ACCUNEB) 1 25 MG/3ML nebulizer solution Take 3 mL (1 25 mg total) by nebulization every 6 (six) hours as needed for wheezing or shortness of breath, Starting Wed 12/4/2019, Print      azithromycin (ZITHROMAX) 250 mg tablet Take 500 mg day 1, 250 mg days 2-5, Print      !! predniSONE 10 mg tablet Take 1 tablet (10 mg total) by mouth daily 6 tabs for 3 days and then decrease by one tab every 3 days until complete, Starting Wed 12/4/2019, Print      !! predniSONE 20 mg tablet Take 60 mg by mouth daily ×4 days, 40 mg by mouth daily ×4 days, 20 mg by mouth daily ×4 days, Print      !! predniSONE 20 mg tablet Take 60 mg by mouth daily 4 days, 40 mg by mouth daily 4 days, 20 mg by mouth daily 4 days, Normal       !! - Potential duplicate medications found  Please discuss with provider  No discharge procedures on file      PDMP Review     None          ED Provider  Electronically Signed by           Gregory Randhawa DO  09/15/22 5794

## 2022-11-18 ENCOUNTER — HOSPITAL ENCOUNTER (EMERGENCY)
Facility: HOSPITAL | Age: 72
Discharge: HOME | End: 2022-11-18
Attending: EMERGENCY MEDICINE

## 2022-11-18 ENCOUNTER — APPOINTMENT (EMERGENCY)
Dept: RADIOLOGY | Facility: HOSPITAL | Age: 72
End: 2022-11-18

## 2022-11-18 VITALS
RESPIRATION RATE: 20 BRPM | OXYGEN SATURATION: 95 % | TEMPERATURE: 98.1 F | SYSTOLIC BLOOD PRESSURE: 186 MMHG | DIASTOLIC BLOOD PRESSURE: 94 MMHG | HEART RATE: 108 BPM

## 2022-11-18 DIAGNOSIS — J45.901 ACUTE ASTHMA EXACERBATION: Primary | ICD-10-CM

## 2022-11-18 LAB
ALBUMIN SERPL BCP-MCNC: 4.3 G/DL (ref 3.5–5)
ALP SERPL-CCNC: 77 U/L (ref 46–116)
ALT SERPL W P-5'-P-CCNC: 50 U/L (ref 12–78)
ANION GAP SERPL CALCULATED.3IONS-SCNC: 12 MMOL/L (ref 4–13)
APTT PPP: 26 SECONDS (ref 23–37)
AST SERPL W P-5'-P-CCNC: 31 U/L (ref 5–45)
ATRIAL RATE: 92 BPM
BASOPHILS # BLD AUTO: 0.08 THOUSANDS/ÂΜL (ref 0–0.1)
BASOPHILS NFR BLD AUTO: 1 % (ref 0–1)
BILIRUB DIRECT SERPL-MCNC: 0.18 MG/DL (ref 0–0.2)
BILIRUB SERPL-MCNC: 0.74 MG/DL (ref 0.2–1)
BUN SERPL-MCNC: 12 MG/DL (ref 5–25)
CALCIUM SERPL-MCNC: 9.1 MG/DL (ref 8.3–10.1)
CARDIAC TROPONIN I PNL SERPL HS: 6 NG/L
CHLORIDE SERPL-SCNC: 101 MMOL/L (ref 96–108)
CO2 SERPL-SCNC: 25 MMOL/L (ref 21–32)
CREAT SERPL-MCNC: 0.98 MG/DL (ref 0.6–1.3)
EOSINOPHIL # BLD AUTO: 0.39 THOUSAND/ÂΜL (ref 0–0.61)
EOSINOPHIL NFR BLD AUTO: 5 % (ref 0–6)
ERYTHROCYTE [DISTWIDTH] IN BLOOD BY AUTOMATED COUNT: 12.1 % (ref 11.6–15.1)
FLUAV RNA RESP QL NAA+PROBE: NEGATIVE
FLUBV RNA RESP QL NAA+PROBE: NEGATIVE
GFR SERPL CREATININE-BSD FRML MDRD: 57 ML/MIN/1.73SQ M
GLUCOSE SERPL-MCNC: 104 MG/DL (ref 65–140)
HCT VFR BLD AUTO: 46.7 % (ref 34.8–46.1)
HGB BLD-MCNC: 15.4 G/DL (ref 11.5–15.4)
IMM GRANULOCYTES # BLD AUTO: 0.03 THOUSAND/UL (ref 0–0.2)
IMM GRANULOCYTES NFR BLD AUTO: 0 % (ref 0–2)
INR PPP: 1.04 (ref 0.84–1.19)
LYMPHOCYTES # BLD AUTO: 1.92 THOUSANDS/ÂΜL (ref 0.6–4.47)
LYMPHOCYTES NFR BLD AUTO: 23 % (ref 14–44)
MCH RBC QN AUTO: 30.6 PG (ref 26.8–34.3)
MCHC RBC AUTO-ENTMCNC: 33 G/DL (ref 31.4–37.4)
MCV RBC AUTO: 93 FL (ref 82–98)
MONOCYTES # BLD AUTO: 0.66 THOUSAND/ÂΜL (ref 0.17–1.22)
MONOCYTES NFR BLD AUTO: 8 % (ref 4–12)
NEUTROPHILS # BLD AUTO: 5.2 THOUSANDS/ÂΜL (ref 1.85–7.62)
NEUTS SEG NFR BLD AUTO: 63 % (ref 43–75)
NRBC BLD AUTO-RTO: 0 /100 WBCS
NT-PROBNP SERPL-MCNC: 94 PG/ML
P AXIS: 70 DEGREES
PLATELET # BLD AUTO: 224 THOUSANDS/UL (ref 149–390)
PMV BLD AUTO: 9.7 FL (ref 8.9–12.7)
POTASSIUM SERPL-SCNC: 3.8 MMOL/L (ref 3.5–5.3)
PR INTERVAL: 134 MS
PROT SERPL-MCNC: 7.1 G/DL (ref 6.4–8.4)
PROTHROMBIN TIME: 13.4 SECONDS (ref 11.6–14.5)
QRS AXIS: 72 DEGREES
QRSD INTERVAL: 80 MS
QT INTERVAL: 384 MS
QTC INTERVAL: 474 MS
RBC # BLD AUTO: 5.03 MILLION/UL (ref 3.81–5.12)
RSV RNA RESP QL NAA+PROBE: NEGATIVE
SARS-COV-2 RNA RESP QL NAA+PROBE: NEGATIVE
SODIUM SERPL-SCNC: 138 MMOL/L (ref 135–147)
T WAVE AXIS: 96 DEGREES
VENTRICULAR RATE: 92 BPM
WBC # BLD AUTO: 8.28 THOUSAND/UL (ref 4.31–10.16)

## 2022-11-18 RX ORDER — IPRATROPIUM BROMIDE AND ALBUTEROL SULFATE 2.5; .5 MG/3ML; MG/3ML
3 SOLUTION RESPIRATORY (INHALATION) ONCE
Status: COMPLETED | OUTPATIENT
Start: 2022-11-18 | End: 2022-11-18

## 2022-11-18 RX ORDER — PREDNISONE 10 MG/1
TABLET ORAL
Qty: 36 TABLET | Refills: 0 | Status: SHIPPED | OUTPATIENT
Start: 2022-11-18 | End: 2022-11-27

## 2022-11-18 RX ORDER — POTASSIUM CHLORIDE 20 MEQ/1
20 TABLET, EXTENDED RELEASE ORAL ONCE
Status: COMPLETED | OUTPATIENT
Start: 2022-11-18 | End: 2022-11-18

## 2022-11-18 RX ORDER — FUROSEMIDE 10 MG/ML
40 INJECTION INTRAMUSCULAR; INTRAVENOUS ONCE
Status: COMPLETED | OUTPATIENT
Start: 2022-11-18 | End: 2022-11-18

## 2022-11-18 RX ORDER — METHYLPREDNISOLONE SODIUM SUCCINATE 125 MG/2ML
60 INJECTION, POWDER, LYOPHILIZED, FOR SOLUTION INTRAMUSCULAR; INTRAVENOUS ONCE
Status: COMPLETED | OUTPATIENT
Start: 2022-11-18 | End: 2022-11-18

## 2022-11-18 RX ADMIN — METHYLPREDNISOLONE SODIUM SUCCINATE 60 MG: 125 INJECTION, POWDER, FOR SOLUTION INTRAMUSCULAR; INTRAVENOUS at 15:11

## 2022-11-18 RX ADMIN — IPRATROPIUM BROMIDE AND ALBUTEROL SULFATE 3 ML: 2.5; .5 SOLUTION RESPIRATORY (INHALATION) at 14:05

## 2022-11-18 RX ADMIN — FUROSEMIDE 40 MG: 10 INJECTION, SOLUTION INTRAMUSCULAR; INTRAVENOUS at 14:05

## 2022-11-18 RX ADMIN — NITROGLYCERIN 1 INCH: 20 OINTMENT TOPICAL at 14:05

## 2022-11-18 RX ADMIN — POTASSIUM CHLORIDE 20 MEQ: 1500 TABLET, EXTENDED RELEASE ORAL at 15:11

## 2022-11-18 NOTE — ED PROVIDER NOTES
History  Chief Complaint   Patient presents with   • Shortness of Breath     Pt has been having an asthma exacerbation for the past couple of days  She has been using home neb treatments and checking her pulse ox at home  Pt states the lowest her oxygen went was 89%  She has been able to manage it well at home but feels the treatments aren't working as well as they have in the past      66 yo female with h/o moderate persistent asthma and prior respiratory arrest and intubation x 2 who presents to ED c/o several days of dyspnea, wheezing, nonproductive cough and orthopnea  No leg pain or swelling  No h/o CHF  States she follows regularly with her cardiologist and reports a normal cardiac cath last year  She states during her intubation and ICU admission from prior respiratory arrest (thought to be due to acute asthma exacerbation) she had an EF of 20% but notes that on her last echo last year her EF was 60%  Her dyspnea in the last few days has only had minimal improvement with home albuterol treatment  She is not currently on steroids  Additional history per SO at bedside  Prior to Admission Medications   Prescriptions Last Dose Informant Patient Reported? Taking?    albuterol (ACCUNEB) 1 25 MG/3ML nebulizer solution   No No   Sig: Take 3 mL (1 25 mg total) by nebulization every 6 (six) hours as needed for wheezing or shortness of breath   azithromycin (ZITHROMAX) 250 mg tablet   No No   Sig: Take 500 mg day 1, 250 mg days 2-5   predniSONE 10 mg tablet   No No   Sig: Take 1 tablet (10 mg total) by mouth daily 6 tabs for 3 days and then decrease by one tab every 3 days until complete   predniSONE 20 mg tablet   No No   Sig: Take 60 mg by mouth daily ×4 days, 40 mg by mouth daily ×4 days, 20 mg by mouth daily ×4 days   predniSONE 20 mg tablet   No No   Sig: Take 60 mg by mouth daily 4 days, 40 mg by mouth daily 4 days, 20 mg by mouth daily 4 days   predniSONE 20 mg tablet   No No   Sig: Take 40 mg (2 pills) for 5 days, 20 mg (1 pill) for 5 days, and 10 mg (1/2 pill) for 5 days      Facility-Administered Medications: None       Past Medical History:   Diagnosis Date   • Asthma    • Hypertension        Past Surgical History:   Procedure Laterality Date   • APPENDECTOMY         History reviewed  No pertinent family history  I have reviewed and agree with the history as documented  E-Cigarette/Vaping     E-Cigarette/Vaping Substances     Social History     Tobacco Use   • Smoking status: Former   • Smokeless tobacco: Never   Substance Use Topics   • Alcohol use: No   • Drug use: No       Review of Systems   Respiratory: Positive for cough, shortness of breath and wheezing  All other systems reviewed and are negative  Physical Exam  Physical Exam  Vitals and nursing note reviewed  Constitutional:       General: She is not in acute distress  Appearance: She is well-developed and well-nourished  She is not ill-appearing, toxic-appearing or diaphoretic  HENT:      Head: Normocephalic and atraumatic  Eyes:      Extraocular Movements: EOM normal       Conjunctiva/sclera: Conjunctivae normal       Pupils: Pupils are equal, round, and reactive to light  Neck:      Vascular: No JVD  Cardiovascular:      Rate and Rhythm: Regular rhythm  Tachycardia present  Pulses: Intact distal pulses  Heart sounds: Normal heart sounds  Pulmonary:      Effort: Pulmonary effort is normal  No respiratory distress  Breath sounds: No stridor  Wheezing present  No rhonchi or rales  Abdominal:      General: There is no distension  Palpations: Abdomen is soft  Tenderness: There is no abdominal tenderness  There is no guarding or rebound  Musculoskeletal:         General: No tenderness, deformity or edema  Normal range of motion  Cervical back: Normal range of motion and neck supple  No rigidity or tenderness  Right lower leg: No edema  Left lower leg: No edema     Skin:     General: Skin is warm and dry  Capillary Refill: Capillary refill takes less than 2 seconds  Coloration: Skin is not jaundiced or pale  Findings: No bruising, erythema, lesion or rash  Neurological:      General: No focal deficit present  Mental Status: She is alert and oriented to person, place, and time  Cranial Nerves: No cranial nerve deficit  Sensory: No sensory deficit  Motor: No weakness or abnormal muscle tone        Coordination: Coordination normal          Vital Signs  ED Triage Vitals [11/18/22 1303]   Temperature Pulse Respirations Blood Pressure SpO2   98 1 °F (36 7 °C) 100 20 (!) 205/92 93 %      Temp Source Heart Rate Source Patient Position - Orthostatic VS BP Location FiO2 (%)   Tympanic Monitor Sitting Left arm --      Pain Score       --           Vitals:    11/18/22 1303 11/18/22 1518   BP: (!) 205/92 (!) 186/94   Pulse: 100 (!) 108   Patient Position - Orthostatic VS: Sitting Lying         Visual Acuity      ED Medications  Medications   furosemide (LASIX) injection 40 mg (40 mg Intravenous Given 11/18/22 1405)   nitroglycerin (NITRO-BID) 2 % TD ointment 1 inch (1 inch Topical Given 11/18/22 1405)   ipratropium-albuterol (DUO-NEB) 0 5-2 5 mg/3 mL inhalation solution 3 mL (3 mL Nebulization Given 11/18/22 1405)   methylPREDNISolone sodium succinate (Solu-MEDROL) injection 60 mg (60 mg Intravenous Given 11/18/22 1511)   potassium chloride (K-DUR,KLOR-CON) CR tablet 20 mEq (20 mEq Oral Given 11/18/22 1511)       Diagnostic Studies  Results Reviewed     Procedure Component Value Units Date/Time    HS Troponin I 4hr [[id number]]     Lab Status: No result Specimen: Blood     FLU/RSV/COVID - if FLU/RSV clinically relevant [[id number]]  (Normal) Collected: 11/18/22 1345    Lab Status: Final result Specimen: Nares from Nose Updated: 11/18/22 1504     SARS-CoV-2 Negative     INFLUENZA A PCR Negative     INFLUENZA B PCR Negative     RSV PCR Negative    Narrative:      FOR PEDIATRIC PATIENTS - copy/paste COVID Guidelines URL to browser: [email or web address]    SARS-CoV-2 assay is a Nucleic Acid Amplification assay intended for the  qualitative detection of nucleic acid from SARS-CoV-2 in nasopharyngeal  swabs  Results are for the presumptive identification of SARS-CoV-2 RNA  Positive results are indicative of infection with SARS-CoV-2, the virus  causing COVID-19, but do not rule out bacterial infection or co-infection  with other viruses  Laboratories within the [place] and its  territories are required to report all positive results to the appropriate  public health authorities  Negative results do not preclude SARS-CoV-2  infection and should not be used as the sole basis for treatment or other  patient management decisions  Negative results must be combined with  clinical observations, patient history, and epidemiological information  This test has not been FDA cleared or approved  This test has been authorized by FDA under an Emergency Use Authorization  (EUA)  This test is only authorized for the duration of time the  declaration that circumstances exist justifying the authorization of the  emergency use of an in vitro diagnostic tests for detection of SARS-CoV-2  virus and/or diagnosis of COVID-19 infection under section 564(b)(1) of  the Act, 21 U  S C  482ZNL-9(F)(6), unless the authorization is terminated  or revoked sooner  The test has been validated but independent review by FDA  and CLIA is pending  Test performed using [employer] GeneXpert: This RT-PCR assay targets N2,  a region unique to SARS-CoV-2  A conserved region in the E-gene was chosen  for pan-Sarbecovirus detection which includes SARS-CoV-2  According to CMS-2020-01-R, this platform meets the definition of high-[employer] technology      Hepatic function panel [[id number]]  (Normal) Collected: 11/18/22 1402    Lab Status: Final result Specimen: Blood from Hand, Right Updated: 11/18/22 1455     Total Bilirubin 0 74 mg/dL      Bilirubin, Direct 0 18 mg/dL      Alkaline Phosphatase 77 U/L      AST 31 U/L      ALT 50 U/L      Total Protein 7 1 g/dL      Albumin 4 3 g/dL     NT-BNP PRO [[id number]]  (Normal) Collected: 11/18/22 1402    Lab Status: Final result Specimen: Blood from Hand, Right Updated: 11/18/22 1455     NT-proBNP 94 pg/mL     HS Troponin I 2hr [[id number]]     Lab Status: No result Specimen: Blood     HS Troponin 0hr (reflex protocol) [[id number]]  (Normal) Collected: 11/18/22 1402    Lab Status: Final result Specimen: Blood from Hand, Right Updated: 11/18/22 1453     hs TnI 0hr 6 ng/L     Basic metabolic panel [[id number]] Collected: 11/18/22 1402    Lab Status: Final result Specimen: Blood from Hand, Right Updated: 11/18/22 1447     Sodium 138 mmol/L      Potassium 3 8 mmol/L      Chloride 101 mmol/L      CO2 25 mmol/L      ANION GAP 12 mmol/L      BUN 12 mg/dL      Creatinine 0 98 mg/dL      Glucose 104 mg/dL      Calcium 9 1 mg/dL      eGFR 57 ml/min/1 73sq m     Narrative:      [place] guidelines for Chronic Kidney Disease (CKD):   •  Stage 1 with normal or high GFR (GFR > 90 mL/min/1 73 square meters)  •  Stage 2 Mild CKD (GFR = 60-89 mL/min/1 73 square meters)  •  Stage 3A Moderate CKD (GFR = 45-59 mL/min/1 73 square meters)  •  Stage 3B Moderate CKD (GFR = 30-44 mL/min/1 73 square meters)  •  Stage 4 Severe CKD (GFR = 15-29 mL/min/1 73 square meters)  •  Stage 5 End Stage CKD (GFR <15 mL/min/1 73 square meters)  Note: GFR calculation is accurate only with a steady state creatinine    Protime-INR [[id number]]  (Normal) Collected: 11/18/22 1402    Lab Status: Final result Specimen: Blood from Arm, Right Updated: 11/18/22 1441     Protime 13 4 seconds      INR 1 04    APTT [[id number]]  (Normal) Collected: 11/18/22 1402    Lab Status: Final result Specimen: Blood from Arm, Right Updated: 11/18/22 1441     PTT 26 seconds     CBC and differential [[id number]]  (Abnormal) Collected: 11/18/22 1402    Lab Status: Final result Specimen: Blood from Hand, Right Updated: 11/18/22 1429     WBC 8 28 Thousand/uL      RBC 5 03 Million/uL      Hemoglobin 15 4 g/dL      Hematocrit 46 7 %      MCV 93 fL      MCH 30 6 pg      MCHC 33 0 g/dL      RDW 12 1 %      MPV 9 7 fL      Platelets 956 Thousands/uL      nRBC 0 /100 WBCs      Neutrophils Relative 63 %      Immat GRANS % 0 %      Lymphocytes Relative 23 %      Monocytes Relative 8 %      Eosinophils Relative 5 %      Basophils Relative 1 %      Neutrophils Absolute 5 20 Thousands/µL      Immature Grans Absolute 0 03 Thousand/uL      Lymphocytes Absolute 1 92 Thousands/µL      Monocytes Absolute 0 66 Thousand/µL      Eosinophils Absolute 0 39 Thousand/µL      Basophils Absolute 0 08 Thousands/µL                  XR chest 1 view portable   Final Result by Yamel Palomino MD (11/18 1533)      No acute cardiopulmonary disease  Workstation performed: [id number]                    Procedures  Procedures         ED Course                               SBIRT 20yo+    Flowsheet Row Most Recent Value   SBIRT (25 yo +)    In order to provide better care to our patients, we are screening all of our patients for alcohol and drug use  Would it be okay to ask you these screening questions? Yes Filed at: 11/18/2022 1426   Initial Alcohol Screen: US AUDIT-C     1  How often do you have a drink containing alcohol? 0 Filed at: 11/18/2022 1426   2  How many drinks containing alcohol do you have on a typical day you are drinking? 0 Filed at: 11/18/2022 1426   3b  FEMALE Any Age, or MALE 65+: How often do you have 4 or more drinks on one occassion? 0 Filed at: 11/18/2022 1426   Audit-C Score 0 Filed at: 11/18/2022 1426   RAMON: How many times in the past year have you    Used an illegal drug or used a prescription medication for non-medical reasons?  Never Filed at: 11/18/2022 1426 MDM  Number of Diagnoses or Management Options  Acute asthma exacerbation  Diagnosis management comments: 68 yo with extensive h/o asthma including 2 prior respiratory arrests and prior intubation  We discussed that she is therefore high-risk  I offered admission  She notes significant clinical improvement while here and therefore does not want to be admitted, she provides informed refusal of same  SO at bedside supports her decision to d/c home  Although she is high-risk she is significantly improved and I don't think discharge home is unreasonable  She will call 911 if she notes any worsening of her condition subsequent to d/c  Amount and/or Complexity of Data Reviewed  Clinical lab tests: reviewed and ordered  Tests in the radiology section of CPT®: ordered and reviewed  Tests in the medicine section of CPT®: ordered and reviewed  Review and summarize past medical records: yes  Independent visualization of images, tracings, or specimens: yes        Disposition  Final diagnoses:   Acute asthma exacerbation     Time reflects when diagnosis was documented in both MDM as applicable and the Disposition within this note     Time User Action Codes Description Comment    11/18/2022  3:10 PM Erica Webb [[id number]] Acute asthma exacerbation     11/18/2022  4:12 PM Erica Webb [[id number]] History of difficult intubation     11/18/2022  4:12 PM Santi Wong [[id number]] History of difficult intubation       ED Disposition     ED Disposition   Discharge    Condition   Stable    Date/Time   Fri Nov 18, 2022  3:10 PM    Klaus Pierce discharge to home/self care                 Follow-up Information     Follow up With Specialties Details Why Contact Info Additional [address] [facility] Emergency Department Emergency Medicine  If symptoms worsen [address] [facility] [id number] [facility], [address], [place], [address]          Discharge Medication List as of 11/18/2022  3:11 PM      START taking these medications    Details   ! ! predniSONE 10 mg tablet Multiple Dosages:Starting Fri 11/18/2022, Until Sun 11/20/2022 at 2359, THEN Starting Mon 11/21/2022, Until Wed 11/23/2022 at 2359, THEN Starting Thu 11/24/2022, Until Sat 11/26/2022 at 2359Take 6 tablets (60 mg total) by mouth daily for 3 days, THEN  4 tablets (40 mg total) daily for 3 days, THEN 2 tablets (20 mg total) daily for 3 days  , Normal       !! - Potential duplicate medications found  Please discuss with provider  CONTINUE these medications which have NOT CHANGED    Details   albuterol (ACCUNEB) 1 25 MG/3ML nebulizer solution Take 3 mL (1 25 mg total) by nebulization every 6 (six) hours as needed for wheezing or shortness of breath, Starting Wed 12/4/2019, Print      azithromycin (ZITHROMAX) 250 mg tablet Take 500 mg day 1, 250 mg days 2-5, Print      !! predniSONE 10 mg tablet Take 1 tablet (10 mg total) by mouth daily 6 tabs for 3 days and then decrease by one tab every 3 days until complete, Starting Wed 12/4/2019, Print      !! predniSONE 20 mg tablet Take 60 mg by mouth daily ×4 days, 40 mg by mouth daily ×4 days, 20 mg by mouth daily ×4 days, Print      !! predniSONE 20 mg tablet Take 60 mg by mouth daily 4 days, 40 mg by mouth daily 4 days, 20 mg by mouth daily 4 days, Normal      !! predniSONE 20 mg tablet Take 40 mg (2 pills) for 5 days, 20 mg (1 pill) for 5 days, and 10 mg (1/2 pill) for 5 days, Normal       !! - Potential duplicate medications found  Please discuss with provider  No discharge procedures on file      PDMP Review     None          ED Provider  Electronically Signed by           Douglas White MD  11/18/22 8339

## 2023-01-12 ENCOUNTER — APPOINTMENT (EMERGENCY)
Dept: RADIOLOGY | Facility: HOSPITAL | Age: 73
End: 2023-01-12

## 2023-01-12 ENCOUNTER — HOSPITAL ENCOUNTER (EMERGENCY)
Facility: HOSPITAL | Age: 73
Discharge: HOME | End: 2023-01-12
Attending: EMERGENCY MEDICINE

## 2023-01-12 VITALS
DIASTOLIC BLOOD PRESSURE: 80 MMHG | SYSTOLIC BLOOD PRESSURE: 181 MMHG | RESPIRATION RATE: 22 BRPM | HEART RATE: 99 BPM | OXYGEN SATURATION: 91 % | TEMPERATURE: 98.2 F

## 2023-01-12 DIAGNOSIS — J45.901 ACUTE ASTHMA EXACERBATION: Primary | ICD-10-CM

## 2023-01-12 RX ORDER — PREDNISONE 20 MG/1
40 TABLET ORAL ONCE
Status: COMPLETED | OUTPATIENT
Start: 2023-01-12 | End: 2023-01-12

## 2023-01-12 RX ORDER — PREDNISONE 20 MG/1
TABLET ORAL
Qty: 24 TABLET | Refills: 0 | Status: SHIPPED | OUTPATIENT
Start: 2023-01-12

## 2023-01-12 RX ORDER — ALBUTEROL SULFATE 2.5 MG/3ML
5 SOLUTION RESPIRATORY (INHALATION) ONCE
Status: COMPLETED | OUTPATIENT
Start: 2023-01-12 | End: 2023-01-12

## 2023-01-12 RX ADMIN — ALBUTEROL SULFATE 5 MG: 2.5 SOLUTION RESPIRATORY (INHALATION) at 12:38

## 2023-01-12 RX ADMIN — PREDNISONE 40 MG: 20 TABLET ORAL at 12:39

## 2023-01-12 RX ADMIN — IPRATROPIUM BROMIDE 0.5 MG: 0.5 SOLUTION RESPIRATORY (INHALATION) at 12:38

## 2023-01-12 NOTE — ED PROVIDER NOTES
History  Chief Complaint   Patient presents with   • Asthma     Pt reports her asthma has been acting up for about a week, has used her inhaler but no nebs     Patient is a 72-year-old female with past medical history of asthma and hypertension presenting to the emergency department for evaluation of eczema exacerbation  Reports for the past week her asthma has been acting up and has not had relief with her inhaler or nebulizer  Reports when her exacerbations get this bad she needs steroids  She does report previously being hospitalized secondary to asthma exacerbations  Reports she does not feel as though her exacerbation is bad enough for admission  Patient reports shortness of breath and wheezing  Denies fevers, chills, rash, headache, weakness, dizziness, visual changes, abdominal pain, nausea, vomiting, diarrhea, constipation, chest pain or difficulty breathing  Does not offer any other concerns or complaints  History provided by:  Patient   used: No    Asthma  Duration:  1 week  Timing:  Constant  Progression:  Worsening  Associated symptoms: wheezing    Associated symptoms: no abdominal pain, no chest pain, no congestion, no cough, no diarrhea, no ear pain, no fatigue, no fever, no headaches, no loss of consciousness, no myalgias, no nausea, no rash, no rhinorrhea, no shortness of breath, no sore throat and no vomiting        Prior to Admission Medications   Prescriptions Last Dose Informant Patient Reported? Taking?    albuterol (ACCUNEB) 1 25 MG/3ML nebulizer solution   No No   Sig: Take 3 mL (1 25 mg total) by nebulization every 6 (six) hours as needed for wheezing or shortness of breath   azithromycin (ZITHROMAX) 250 mg tablet   No No   Sig: Take 500 mg day 1, 250 mg days 2-5   predniSONE 10 mg tablet   No No   Sig: Take 1 tablet (10 mg total) by mouth daily 6 tabs for 3 days and then decrease by one tab every 3 days until complete   predniSONE 20 mg tablet   No No   Sig: Take 60 mg by mouth daily ×4 days, 40 mg by mouth daily ×4 days, 20 mg by mouth daily ×4 days   predniSONE 20 mg tablet   No No   Sig: Take 60 mg by mouth daily 4 days, 40 mg by mouth daily 4 days, 20 mg by mouth daily 4 days   predniSONE 20 mg tablet   No No   Sig: Take 40 mg (2 pills) for 5 days, 20 mg (1 pill) for 5 days, and 10 mg (1/2 pill) for 5 days   predniSONE 20 mg tablet   No Yes   Sig: Take 60 mg by mouth daily 4 days, 40 mg by mouth daily 4 days, 20 mg by mouth daily 4 days      Facility-Administered Medications: None       Past Medical History:   Diagnosis Date   • Asthma    • Hypertension        Past Surgical History:   Procedure Laterality Date   • APPENDECTOMY         History reviewed  No pertinent family history  I have reviewed and agree with the history as documented  E-Cigarette/Vaping     E-Cigarette/Vaping Substances     Social History     Tobacco Use   • Smoking status: Former   • Smokeless tobacco: Never   Substance Use Topics   • Alcohol use: No   • Drug use: No       Review of Systems   Constitutional: Negative for chills, fatigue and fever  HENT: Negative for congestion, ear pain, rhinorrhea and sore throat  Eyes: Negative for pain and visual disturbance  Respiratory: Positive for wheezing  Negative for cough, chest tightness and shortness of breath  Cardiovascular: Negative for chest pain and palpitations  Gastrointestinal: Negative for abdominal pain, diarrhea, nausea and vomiting  Genitourinary: Negative for dysuria and hematuria  Musculoskeletal: Negative for arthralgias, back pain and myalgias  Skin: Negative for color change and rash  Neurological: Negative for seizures, loss of consciousness, syncope and headaches  All other systems reviewed and are negative  Physical Exam  Physical Exam  Vitals and nursing note reviewed  Constitutional:       General: She is not in acute distress  Appearance: Normal appearance  She is well-developed   She is not ill-appearing, toxic-appearing or diaphoretic  HENT:      Head: Normocephalic and atraumatic  Right Ear: External ear normal       Left Ear: External ear normal       Nose: Nose normal       Mouth/Throat:      Mouth: Mucous membranes are moist    Eyes:      General: No scleral icterus  Right eye: No discharge  Left eye: No discharge  Conjunctiva/sclera: Conjunctivae normal    Cardiovascular:      Rate and Rhythm: Normal rate and regular rhythm  Heart sounds: No murmur heard  Pulmonary:      Effort: Pulmonary effort is normal  No respiratory distress  Breath sounds: Wheezing (bilateral) present  Abdominal:      Palpations: Abdomen is soft  Tenderness: There is no abdominal tenderness  Musculoskeletal:         General: No swelling, deformity or signs of injury  Normal range of motion  Cervical back: Normal range of motion and neck supple  No rigidity  Skin:     General: Skin is warm and dry  Capillary Refill: Capillary refill takes less than 2 seconds  Coloration: Skin is not jaundiced  Findings: No erythema or rash  Neurological:      General: No focal deficit present  Mental Status: She is alert and oriented to person, place, and time  Mental status is at baseline  Cranial Nerves: No cranial nerve deficit  Gait: Gait normal    Psychiatric:         Mood and Affect: Mood normal          Behavior: Behavior normal          Thought Content:  Thought content normal          Judgment: Judgment normal          Vital Signs  ED Triage Vitals [01/12/23 1131]   Temperature Pulse Respirations Blood Pressure SpO2   98 2 °F (36 8 °C) 99 22 (!) 181/80 91 %      Temp src Heart Rate Source Patient Position - Orthostatic VS BP Location FiO2 (%)   -- -- -- -- --      Pain Score       --           Vitals:    01/12/23 1131   BP: (!) 181/80   Pulse: 99         Visual Acuity      ED Medications  Medications   albuterol inhalation solution 5 mg (5 mg Nebulization Given 1/12/23 1238)   ipratropium (ATROVENT) 0 02 % inhalation solution 0 5 mg (0 5 mg Nebulization Given 1/12/23 1238)   predniSONE tablet 40 mg (40 mg Oral Given 1/12/23 1239)       Diagnostic Studies  Results Reviewed     None                 XR chest 2 views   Final Result by Cj Degroot MD (01/12 1258)      No acute cardiopulmonary disease  Findings are stable            Workstation performed: [id number]                    Procedures  Procedures         ED Course  ED Course as of 01/12/23 1551   Thu Jan 12, 2023   1332 Patient is requesting to be discharged and continue steroids, DuoNeb and albuterol inhaler at home despite continued wheezing and shortness of breath  Identification of Seniors at [address] [place] [address] Most Recent Value   (ISAR) Identification of Seniors at Risk    Before the illness or injury that brought you to the Emergency, did you need someone to help you on a regular basis? 0 Filed at: 01/12/2023 1132   In the last 24 hours, have you needed more help than usual? 0 Filed at: 01/12/2023 1132   Have you been hospitalized for one or more nights during the past 6 months? 0 Filed at: 01/12/2023 1132   In general, do you see well? 0 Filed at: 01/12/2023 1132   In general, do you have serious problems with your memory? 0 Filed at: 01/12/2023 1132   Do you take more than three different medications every day? 1 Filed at: 01/12/2023 1132   ISAR Score 1 Filed at: 01/12/2023 1132                  Medical Decision Making    This is a 77-year-old female presenting to the emergency department for evaluation of asthma exacerbation  Reports for the past week she had an asthma exacerbation that has not gotten better with albuterol inhaler or nebulizer  Reports history of asthma exacerbations that are only relieved with steroid treatment  Patient reports shortness of breath and wheezing  Patient is not in acute distress on initial examination      Differential diagnosis to include but is not limited to: Asthma exacerbation, pneumonia    Initial ED Plan: Prednisone, ipratropium, albuterol, chest x-ray    ED results: No acute cardiopulmonary disease  Xray images chest independently visualized and interpreted by me   -Patient continues to have symptoms after first nebulizer treatment  Patient is requesting to be discharged and have a prednisone taper prescribed for her  Patient reports this is worked in the past for her asthma exacerbations  I discussed with the patient risks of leaving prior to wheezing resolution  Patient reports she understands the risks and will follow strict return precautions  Final ED assessment: Patient is stable and well appearing  Discussed radiologic studies  Discussed prednisone taper  Discussed albuterol nebulizer and inhaler as needed  Strict return precautions were discussed including but not limited to shortness of breath, difficulty breathing, chest pain, fevers  Patient verbalized understanding and is agreeable with the plan for discharge  Acute asthma exacerbation: complicated acute illness or injury  Amount and/or Complexity of Data Reviewed  Radiology: ordered  Risk  Prescription drug management  Disposition  Final diagnoses:   Acute asthma exacerbation     Time reflects when diagnosis was documented in both MDM as applicable and the Disposition within this note     Time User Action Codes Description Comment    1/12/2023  1:34 PM Anuel Espinal, [facility] [[id number]] Acute asthma exacerbation       ED Disposition     ED Disposition   Discharge    Condition   Stable    Date/Time   Thu Jan 12, 2023  1:34 PM    [address] discharge to home/self care                 Follow-up Information     Follow up With Specialties Details Why Contact Info Additional [address] [facility], MD Internal Medicine Call in 3 days For follow up Robin Howell [address]  [phone] 5[address] [facility] Emergency Department Emergency Medicine Go to  If symptoms worsen [address] Emergency Department, [address] [place] [address], [place], [place], [address]          Discharge Medication List as of 1/12/2023  1:36 PM      CONTINUE these medications which have CHANGED    Details   ! ! predniSONE 20 mg tablet Take 60 mg by mouth daily 4 days, 40 mg by mouth daily 4 days, 20 mg by mouth daily 4 days, Normal       !! - Potential duplicate medications found  Please discuss with provider  CONTINUE these medications which have NOT CHANGED    Details   albuterol (ACCUNEB) 1 25 MG/3ML nebulizer solution Take 3 mL (1 25 mg total) by nebulization every 6 (six) hours as needed for wheezing or shortness of breath, Starting Wed 12/4/2019, Print      azithromycin (ZITHROMAX) 250 mg tablet Take 500 mg day 1, 250 mg days 2-5, Print      !! predniSONE 10 mg tablet Take 1 tablet (10 mg total) by mouth daily 6 tabs for 3 days and then decrease by one tab every 3 days until complete, Starting Wed 12/4/2019, Print      !! predniSONE 20 mg tablet Take 60 mg by mouth daily 4 days, 40 mg by mouth daily 4 days, 20 mg by mouth daily 4 days, Normal      !! predniSONE 20 mg tablet Take 40 mg (2 pills) for 5 days, 20 mg (1 pill) for 5 days, and 10 mg (1/2 pill) for 5 days, Normal       !! - Potential duplicate medications found  Please discuss with provider  No discharge procedures on file      PDMP Review     None          ED Provider  Electronically Signed by           Alissa Rivera PA-C  01/12/23 2213

## 2023-01-12 NOTE — DISCHARGE INSTRUCTIONS
Follow up with PCP  Prednisone taper  Return to the ED with new or worsening symptoms including but not limited to shortness of breath, difficulty breathing, chest pain, fever

## 2023-02-17 ENCOUNTER — APPOINTMENT (EMERGENCY)
Dept: RADIOLOGY | Facility: HOSPITAL | Age: 73
End: 2023-02-17

## 2023-02-17 ENCOUNTER — HOSPITAL ENCOUNTER (EMERGENCY)
Facility: HOSPITAL | Age: 73
Discharge: HOME | End: 2023-02-17
Attending: EMERGENCY MEDICINE

## 2023-02-17 VITALS
RESPIRATION RATE: 18 BRPM | SYSTOLIC BLOOD PRESSURE: 175 MMHG | OXYGEN SATURATION: 92 % | HEART RATE: 88 BPM | TEMPERATURE: 98.4 F | WEIGHT: 177.47 LBS | BODY MASS INDEX: 26.98 KG/M2 | DIASTOLIC BLOOD PRESSURE: 78 MMHG

## 2023-02-17 DIAGNOSIS — U07.1 COVID-19: Primary | ICD-10-CM

## 2023-02-17 LAB
ALBUMIN SERPL BCP-MCNC: 3.7 G/DL (ref 3.5–5)
ALP SERPL-CCNC: 82 U/L (ref 46–116)
ALT SERPL W P-5'-P-CCNC: 16 U/L (ref 12–78)
ANION GAP SERPL CALCULATED.3IONS-SCNC: 12 MMOL/L (ref 4–13)
AST SERPL W P-5'-P-CCNC: 23 U/L (ref 5–45)
BASOPHILS # BLD AUTO: 0.04 THOUSANDS/ÂΜL (ref 0–0.1)
BASOPHILS NFR BLD AUTO: 1 % (ref 0–1)
BILIRUB SERPL-MCNC: 0.9 MG/DL (ref 0.2–1)
BUN SERPL-MCNC: 6 MG/DL (ref 5–25)
CALCIUM SERPL-MCNC: 8.7 MG/DL (ref 8.3–10.1)
CARDIAC TROPONIN I PNL SERPL HS: 5 NG/L
CHLORIDE SERPL-SCNC: 103 MMOL/L (ref 96–108)
CO2 SERPL-SCNC: 24 MMOL/L (ref 21–32)
CREAT SERPL-MCNC: 0.96 MG/DL (ref 0.6–1.3)
EOSINOPHIL # BLD AUTO: 0.16 THOUSAND/ÂΜL (ref 0–0.61)
EOSINOPHIL NFR BLD AUTO: 2 % (ref 0–6)
ERYTHROCYTE [DISTWIDTH] IN BLOOD BY AUTOMATED COUNT: 11.2 % (ref 11.6–15.1)
GFR SERPL CREATININE-BSD FRML MDRD: 59 ML/MIN/1.73SQ M
GLUCOSE SERPL-MCNC: 103 MG/DL (ref 65–140)
HCT VFR BLD AUTO: 43.7 % (ref 34.8–46.1)
HGB BLD-MCNC: 15 G/DL (ref 11.5–15.4)
IMM GRANULOCYTES # BLD AUTO: 0.02 THOUSAND/UL (ref 0–0.2)
IMM GRANULOCYTES NFR BLD AUTO: 0 % (ref 0–2)
LYMPHOCYTES # BLD AUTO: 1.26 THOUSANDS/ÂΜL (ref 0.6–4.47)
LYMPHOCYTES NFR BLD AUTO: 16 % (ref 14–44)
MAGNESIUM SERPL-MCNC: 1.9 MG/DL (ref 1.6–2.6)
MCH RBC QN AUTO: 31 PG (ref 26.8–34.3)
MCHC RBC AUTO-ENTMCNC: 34.3 G/DL (ref 31.4–37.4)
MCV RBC AUTO: 90 FL (ref 82–98)
MONOCYTES # BLD AUTO: 0.86 THOUSAND/ÂΜL (ref 0.17–1.22)
MONOCYTES NFR BLD AUTO: 11 % (ref 4–12)
NEUTROPHILS # BLD AUTO: 5.4 THOUSANDS/ÂΜL (ref 1.85–7.62)
NEUTS SEG NFR BLD AUTO: 70 % (ref 43–75)
NRBC BLD AUTO-RTO: 0 /100 WBCS
PLATELET # BLD AUTO: 247 THOUSANDS/UL (ref 149–390)
PMV BLD AUTO: 9.3 FL (ref 8.9–12.7)
POTASSIUM SERPL-SCNC: 4.1 MMOL/L (ref 3.5–5.3)
PROT SERPL-MCNC: 6.7 G/DL (ref 6.4–8.4)
RBC # BLD AUTO: 4.84 MILLION/UL (ref 3.81–5.12)
SODIUM SERPL-SCNC: 139 MMOL/L (ref 135–147)
WBC # BLD AUTO: 7.74 THOUSAND/UL (ref 4.31–10.16)

## 2023-02-17 RX ORDER — NIRMATRELVIR AND RITONAVIR 150-100 MG
2 KIT ORAL 2 TIMES DAILY
Qty: 20 TABLET | Refills: 0 | Status: SHIPPED | OUTPATIENT
Start: 2023-02-17 | End: 2023-02-17 | Stop reason: SDUPTHER

## 2023-02-17 RX ORDER — DEXAMETHASONE 4 MG/1
10 TABLET ORAL ONCE
Status: COMPLETED | OUTPATIENT
Start: 2023-02-17 | End: 2023-02-17

## 2023-02-17 RX ORDER — NIRMATRELVIR AND RITONAVIR 150-100 MG
2 KIT ORAL 2 TIMES DAILY
Qty: 20 TABLET | Refills: 0 | Status: SHIPPED | OUTPATIENT
Start: 2023-02-17 | End: 2023-02-22

## 2023-02-17 RX ADMIN — DEXAMETHASONE 10 MG: 4 TABLET ORAL at 08:41

## 2023-02-17 RX ADMIN — SODIUM CHLORIDE 1000 ML: 0.9 INJECTION, SOLUTION INTRAVENOUS at 06:32

## 2023-02-17 NOTE — DISCHARGE INSTRUCTIONS
Your COVID test is positive  Remain in quarantine for 5 days  You may leave quarantine on day 6 if you are fever free and feeling improved  You must wear a mask until day 10  Drink plenty of fluids and rest  Take Paxlovid as prescribed  Take a multivitamin daily  Please follow-up with your family doctor  Return to the ER with any worsening symptoms or trouble breathing

## 2023-02-17 NOTE — ED PROVIDER NOTES
History  Chief Complaint   Patient presents with   • Shortness of Breath     Pt reports taking positive home COVID test yest   recently hospitalized for same  Increased SOB at home  65yo female with a history of asthma and hypertension presenting via EMS for evaluation of flu-like symptoms x 1 day  She reports cough, congestion, body aches, and malaise that began yesterday  Her  was just discharged yesterday after a hospitalization for COVID  She took a home COVID test that came back positive  The triage note indicates shortness of breath but she denies any shortness of breath during my assessment  She also denies chest pain  She has a history of multiple prior intubations for asthma  She denies any wheezing and has not needed her rescue inhaler  History provided by:  Patient   used: No    Flu Symptoms  Presenting symptoms: cough, fatigue and myalgias    Presenting symptoms: no fever, no shortness of breath and no vomiting    Severity:  Moderate  Onset quality:  Gradual  Duration:  1 day  Progression:  Worsening  Chronicity:  New  Relieved by:  Nothing  Associated symptoms: nasal congestion    Associated symptoms: no mental status change, no neck stiffness and no syncope    Risk factors: sick contacts    Risk factors: no immunocompromised state        Prior to Admission Medications   Prescriptions Last Dose Informant Patient Reported? Taking?    albuterol (ACCUNEB) 1 25 MG/3ML nebulizer solution   No No   Sig: Take 3 mL (1 25 mg total) by nebulization every 6 (six) hours as needed for wheezing or shortness of breath   azithromycin (ZITHROMAX) 250 mg tablet   No No   Sig: Take 500 mg day 1, 250 mg days 2-5   predniSONE 10 mg tablet   No No   Sig: Take 1 tablet (10 mg total) by mouth daily 6 tabs for 3 days and then decrease by one tab every 3 days until complete   predniSONE 20 mg tablet   No No   Sig: Take 60 mg by mouth daily 4 days, 40 mg by mouth daily 4 days, 20 mg by mouth daily 4 days   predniSONE 20 mg tablet   No No   Sig: Take 40 mg (2 pills) for 5 days, 20 mg (1 pill) for 5 days, and 10 mg (1/2 pill) for 5 days   predniSONE 20 mg tablet   No No   Sig: Take 60 mg by mouth daily 4 days, 40 mg by mouth daily 4 days, 20 mg by mouth daily 4 days      Facility-Administered Medications: None       Past Medical History:   Diagnosis Date   • Asthma    • Hypertension        Past Surgical History:   Procedure Laterality Date   • APPENDECTOMY         History reviewed  No pertinent family history  I have reviewed and agree with the history as documented  E-Cigarette/Vaping     E-Cigarette/Vaping Substances     Social History     Tobacco Use   • Smoking status: Former   • Smokeless tobacco: Never   Substance Use Topics   • Alcohol use: No   • Drug use: No       Review of Systems   Constitutional: Positive for fatigue  Negative for fever  HENT: Positive for congestion  Eyes: Negative for discharge and redness  Respiratory: Positive for cough  Negative for shortness of breath  Gastrointestinal: Negative for abdominal pain and vomiting  Musculoskeletal: Positive for myalgias  Negative for neck stiffness  Skin: Negative for color change and rash  Psychiatric/Behavioral: Negative for confusion  The patient is not nervous/anxious  All other systems reviewed and are negative  Physical Exam  Physical Exam  Vitals and nursing note reviewed  Constitutional:       General: She is not in acute distress  Appearance: She is well-developed  She is not diaphoretic  HENT:      Head: Normocephalic and atraumatic  Right Ear: External ear normal       Left Ear: External ear normal       Nose: Nose normal    Eyes:      General: No scleral icterus  Right eye: No discharge  Left eye: No discharge  Conjunctiva/sclera: Conjunctivae normal    Cardiovascular:      Rate and Rhythm: Normal rate and regular rhythm  Heart sounds: Normal heart sounds   No murmur heard  Pulmonary:      Effort: Pulmonary effort is normal  No respiratory distress  Breath sounds: Normal breath sounds  No stridor  No wheezing or rales  Abdominal:      General: Bowel sounds are normal  There is no distension  Palpations: Abdomen is soft  Tenderness: There is no abdominal tenderness  There is no guarding  Musculoskeletal:         General: No deformity  Normal range of motion  Cervical back: Normal range of motion and neck supple  Lymphadenopathy:      Cervical: No cervical adenopathy  Skin:     General: Skin is warm and dry  Neurological:      Mental Status: She is alert  She is not disoriented  GCS: GCS eye subscore is 4  GCS verbal subscore is 5  GCS motor subscore is 6     Psychiatric:         Behavior: Behavior normal          Vital Signs  ED Triage Vitals   Temperature Pulse Respirations Blood Pressure SpO2   02/17/23 0615 02/17/23 0615 02/17/23 0615 02/17/23 0615 02/17/23 0615   99 1 °F (37 3 °C) (!) 108 20 (!) 175/100 97 %      Temp Source Heart Rate Source Patient Position - Orthostatic VS BP Location FiO2 (%)   02/17/23 0821 02/17/23 0615 02/17/23 0615 02/17/23 0615 --   Oral Monitor Sitting Right arm       Pain Score       02/17/23 0821       8           Vitals:    02/17/23 0615 02/17/23 0821   BP: (!) 175/100 (!) 175/78   Pulse: (!) 108 88   Patient Position - Orthostatic VS: Sitting Lying         Visual Acuity      ED Medications  Medications   sodium chloride 0 9 % bolus 1,000 mL (0 mL Intravenous Stopped 2/17/23 0841)   dexamethasone (DECADRON) tablet 10 mg (10 mg Oral Given 2/17/23 0841)       Diagnostic Studies  Results Reviewed     Procedure Component Value Units Date/Time    Comprehensive metabolic panel [[id number]] Collected: 02/17/23 0631    Lab Status: Final result Specimen: Blood from Arm, Right Updated: 02/17/23 0716     Sodium 139 mmol/L      Potassium 4 1 mmol/L      Chloride 103 mmol/L      CO2 24 mmol/L      ANION GAP 12 mmol/L BUN 6 mg/dL      Creatinine 0 96 mg/dL      Glucose 103 mg/dL      Calcium 8 7 mg/dL      AST 23 U/L      ALT 16 U/L      Alkaline Phosphatase 82 U/L      Total Protein 6 7 g/dL      Albumin 3 7 g/dL      Total Bilirubin 0 90 mg/dL      eGFR 59 ml/min/1 73sq m     Narrative:      [place] guidelines for Chronic Kidney Disease (CKD):   •  Stage 1 with normal or high GFR (GFR > 90 mL/min/1 73 square meters)  •  Stage 2 Mild CKD (GFR = 60-89 mL/min/1 73 square meters)  •  Stage 3A Moderate CKD (GFR = 45-59 mL/min/1 73 square meters)  •  Stage 3B Moderate CKD (GFR = 30-44 mL/min/1 73 square meters)  •  Stage 4 Severe CKD (GFR = 15-29 mL/min/1 73 square meters)  •  Stage 5 End Stage CKD (GFR <15 mL/min/1 73 square meters)  Note: GFR calculation is accurate only with a steady state creatinine    Magnesium [[id number]]  (Normal) Collected: 02/17/23 0631    Lab Status: Final result Specimen: Blood from Arm, Right Updated: 02/17/23 0716     Magnesium 1 9 mg/dL     HS Troponin 0hr (reflex protocol) [[id number]]  (Normal) Collected: 02/17/23 0631    Lab Status: Final result Specimen: Blood from Arm, Right Updated: 02/17/23 0715     hs TnI 0hr 5 ng/L     CBC and differential [[id number]]  (Abnormal) Collected: 02/17/23 0631    Lab Status: Final result Specimen: Blood from Arm, Right Updated: 02/17/23 0641     WBC 7 74 Thousand/uL      RBC 4 84 Million/uL      Hemoglobin 15 0 g/dL      Hematocrit 43 7 %      MCV 90 fL      MCH 31 0 pg      MCHC 34 3 g/dL      RDW 11 2 %      MPV 9 3 fL      Platelets 918 Thousands/uL      nRBC 0 /100 WBCs      Neutrophils Relative 70 %      Immat GRANS % 0 %      Lymphocytes Relative 16 %      Monocytes Relative 11 %      Eosinophils Relative 2 %      Basophils Relative 1 %      Neutrophils Absolute 5 40 Thousands/µL      Immature Grans Absolute 0 02 Thousand/uL      Lymphocytes Absolute 1 26 Thousands/µL      Monocytes Absolute 0 86 Thousand/µL      Eosinophils Absolute 0 16 Thousand/µL      Basophils Absolute 0 04 Thousands/µL                  XR chest 1 view portable   Final Result by Tracy Stanley MD (02/17 1054)      Minimal left base opacity, likely atelectasis, but mild pneumonia not excluded  Workstation performed: [id number]                    Procedures  ECG 12 Lead Documentation Only    Date/Time: 2/17/2023 4:09 PM  Performed by: Omayra Mae PA-C  Authorized by: Omayra Mae PA-C     Indications / Diagnosis:  Flu symptoms  ECG reviewed by me, the ED Provider: yes    Patient location:  ED  Rate:     ECG rate:  93    ECG rate assessment: normal    Rhythm:     Rhythm: sinus rhythm    Ectopy:     Ectopy: PAC    QRS:     QRS axis:  Normal  Conduction:     Conduction: normal    ST segments:     ST segments:  Normal  T waves:     T waves: normal    Comments:      NSR with frequent PACs  ECG 12 Lead Documentation Only    Date/Time: 2/17/2023 4:10 PM  Performed by: Omayra Mae PA-C  Authorized by: Omayra Mae PA-C     Indications / Diagnosis:  Flu symptoms  ECG reviewed by me, the ED Provider: yes    Patient location:  ED  Previous ECG:     Previous ECG:  Compared to current    Comparison ECG info:  EKG 1 hour ago  Interpretation:     Interpretation: normal    Rate:     ECG rate:  91    ECG rate assessment: normal    Rhythm:     Rhythm: sinus rhythm    Ectopy:     Ectopy: none    QRS:     QRS axis:  Normal  Conduction:     Conduction: normal    ST segments:     ST segments:  Normal  T waves:     T waves: normal               ED Course                     SBIRT 22yo+    Flowsheet Row Most Recent Value   SBIRT (23 yo +)    In order to provide better care to our patients, we are screening all of our patients for alcohol and drug use  Would it be okay to ask you these screening questions? Yes Filed at: 02/17/2023 4735   Initial Alcohol Screen: US AUDIT-C     1   How often do you have a drink containing alcohol? 0 Filed at: 02/17/2023 0622   2  How many drinks containing alcohol do you have on a typical day you are drinking? 0 Filed at: 02/17/2023 0622   3a  Male UNDER 65: How often do you have five or more drinks on one occasion? 0 Filed at: 02/17/2023 0622   3b  FEMALE Any Age, or MALE 65+: How often do you have 4 or more drinks on one occassion? 0 Filed at: 02/17/2023 2189   Audit-C Score 0 Filed at: 02/17/2023 3973   RAMON: How many times in the past year have you    Used an illegal drug or used a prescription medication for non-medical reasons? Never Filed at: 02/17/2023 1442                    Medical Decision Making  65yoF presenting for flu-like symptoms x 1 day  Home COVID test positive and  recently admitted for COVID  Hx of asthma with prior intubations  Denies any shortness of breath, wheezing, chest pain  Has not required her rescue inhaler  She is hypertensive on arrival  Oxygen saturation 97% on room air  Initial ED plan: Check cardiac labs, EKG, and CXR  IV fluid bolus  Final assessment: Labs unremarkable including normal white count, renal function, electrolytes  Initial EKG with unclear underlying rhythm  Reviewed with attending  EKG shows NSR with frequent PACs  Troponin normal  Repeat EKG obtained which now shows NSR  CXR appears clear  No episodes of hypoxia during ED stay  No indication for admission  Given history of severe asthma, patient offered Paxlovid  She is not sure if she wants to take the medication but is requesting a paper script which was provided  Dose of Decadron also given  Supportive care and quarantine discussed  Advised close PCP follow-up  Strict ED return precautions discussed  Patient expressed understanding and is agreeable to plan  Patient discharged in stable condition  COVID-19: acute illness or injury  Amount and/or Complexity of Data Reviewed  Labs: ordered  Radiology: ordered    ECG/medicine tests: ordered and independent interpretation performed  Risk  Prescription drug management  Disposition  Final diagnoses:   [id number]     Time reflects when diagnosis was documented in both MDM as applicable and the Disposition within this note     Time User Action Codes Description Comment    2/17/2023  8:28 AM [facility] [address] [place] [U07 1] COVID-19       ED Disposition     ED Disposition   Discharge    Condition   Stable    Date/Time   Fri Feb 17, 2023  8:28 AM    Comment   Pavithra Dominguez discharge to home/self care                 Follow-up Information     Follow up With Specialties Details Why Contact Info Additional [address] [facility], MD Internal Medicine Schedule an appointment as soon as possible for a visit   [address] Emergency Department Emergency Medicine  If symptoms worsen [address] Emergency Department, [address] [place] [address], [facility]          Discharge Medication List as of 2/17/2023  8:31 AM      START taking these medications    Details   nirmatrelvir & ritonavir (Paxlovid, 150/100,) tablet therapy pack Take 2 tablets by mouth 2 (two) times a day for 5 days Take 1 nirmatrelvir tablet + 1 ritonavir tablet together per dose, Starting Fri 2/17/2023, Until Wed 2/22/2023, Print         CONTINUE these medications which have NOT CHANGED    Details   albuterol (ACCUNEB) 1 25 MG/3ML nebulizer solution Take 3 mL (1 25 mg total) by nebulization every 6 (six) hours as needed for wheezing or shortness of breath, Starting Wed 12/4/2019, Print      azithromycin (ZITHROMAX) 250 mg tablet Take 500 mg day 1, 250 mg days 2-5, Print      !! predniSONE 10 mg tablet Take 1 tablet (10 mg total) by mouth daily 6 tabs for 3 days and then decrease by one tab every 3 days until complete, Starting Wed 12/4/2019, Print      !! predniSONE 20 mg tablet Take 60 mg by mouth daily 4 days, 40 mg by mouth daily 4 days, 20 mg by mouth daily 4 days, Normal      !! predniSONE 20 mg tablet Take 40 mg (2 pills) for 5 days, 20 mg (1 pill) for 5 days, and 10 mg (1/2 pill) for 5 days, Normal      !! predniSONE 20 mg tablet Take 60 mg by mouth daily 4 days, 40 mg by mouth daily 4 days, 20 mg by mouth daily 4 days, Normal       !! - Potential duplicate medications found  Please discuss with provider  No discharge procedures on file      PDMP Review     None          ED Provider  Electronically Signed by           Jamie Otero PA-C  02/18/23 8785

## 2023-02-18 LAB
ATRIAL RATE: 85 BPM
ATRIAL RATE: 87 BPM
ATRIAL RATE: 91 BPM
P AXIS: 76 DEGREES
P AXIS: 82 DEGREES
PR INTERVAL: 142 MS
PR INTERVAL: 144 MS
QRS AXIS: 75 DEGREES
QRS AXIS: 80 DEGREES
QRS AXIS: 82 DEGREES
QRSD INTERVAL: 66 MS
QRSD INTERVAL: 72 MS
QRSD INTERVAL: 74 MS
QT INTERVAL: 362 MS
QT INTERVAL: 378 MS
QT INTERVAL: 380 MS
QTC INTERVAL: 450 MS
QTC INTERVAL: 452 MS
QTC INTERVAL: 464 MS
T WAVE AXIS: 104 DEGREES
T WAVE AXIS: 90 DEGREES
T WAVE AXIS: 90 DEGREES
VENTRICULAR RATE: 85 BPM
VENTRICULAR RATE: 91 BPM
VENTRICULAR RATE: 93 BPM

## 2023-09-21 ENCOUNTER — TELEPHONE (OUTPATIENT)
Age: 73
End: 2023-09-21

## 2023-09-21 NOTE — TELEPHONE ENCOUNTER
Patients GI provider:  Dr. Lewis Em    Number to return call: [phone]    Reason for call: Katya from PCPs office calling asking for last colonoscopy report to be faxed to [phone]    Scheduled procedure/appointment date if applicable: NA

## 2023-09-28 NOTE — TELEPHONE ENCOUNTER
Faxed patient's colonoscopy from 07/12/20/28/2321 to Dr. Leana Blandon office [phone] Memory [place] 9:59 09/

## 2023-12-18 ENCOUNTER — HOSPITAL ENCOUNTER (OUTPATIENT)
Facility: HOSPITAL | Age: 73
Setting detail: OBSERVATION
Discharge: HOME | End: 2023-12-19
Attending: EMERGENCY MEDICINE | Admitting: INTERNAL MEDICINE
Payer: MEDICARE

## 2023-12-18 ENCOUNTER — APPOINTMENT (EMERGENCY)
Dept: RADIOLOGY | Facility: HOSPITAL | Age: 73
End: 2023-12-18
Payer: MEDICARE

## 2023-12-18 DIAGNOSIS — J45.901 ASTHMA EXACERBATION: Primary | ICD-10-CM

## 2023-12-18 PROBLEM — I10 HYPERTENSION: Status: ACTIVE | Noted: 2023-12-18

## 2023-12-18 PROBLEM — J06.9 URI (UPPER RESPIRATORY INFECTION): Status: ACTIVE | Noted: 2023-12-18

## 2023-12-18 LAB
2HR DELTA HS TROPONIN: -1 NG/L
ALBUMIN SERPL BCP-MCNC: 4.5 G/DL (ref 3.5–5)
ALP SERPL-CCNC: 65 U/L (ref 34–104)
ALT SERPL W P-5'-P-CCNC: 17 U/L (ref 7–52)
ANION GAP SERPL CALCULATED.3IONS-SCNC: 6 MMOL/L
APTT PPP: 28 SECONDS (ref 23–37)
AST SERPL W P-5'-P-CCNC: 18 U/L (ref 13–39)
ATRIAL RATE: 93 BPM
BASE EX.OXY STD BLDV CALC-SCNC: 87 % (ref 60–80)
BASE EXCESS BLDV CALC-SCNC: 0.8 MMOL/L
BASOPHILS # BLD AUTO: 0.08 THOUSANDS/ÂΜL (ref 0–0.1)
BASOPHILS NFR BLD AUTO: 1 % (ref 0–1)
BILIRUB SERPL-MCNC: 0.69 MG/DL (ref 0.2–1)
BUN SERPL-MCNC: 9 MG/DL (ref 5–25)
CALCIUM SERPL-MCNC: 9.3 MG/DL (ref 8.4–10.2)
CARDIAC TROPONIN I PNL SERPL HS: 2 NG/L
CARDIAC TROPONIN I PNL SERPL HS: 3 NG/L
CHLORIDE SERPL-SCNC: 105 MMOL/L (ref 96–108)
CO2 SERPL-SCNC: 28 MMOL/L (ref 21–32)
CREAT SERPL-MCNC: 0.9 MG/DL (ref 0.6–1.3)
D DIMER PPP FEU-MCNC: 0.34 UG/ML FEU
EOSINOPHIL # BLD AUTO: 0.39 THOUSAND/ÂΜL (ref 0–0.61)
EOSINOPHIL NFR BLD AUTO: 5 % (ref 0–6)
ERYTHROCYTE [DISTWIDTH] IN BLOOD BY AUTOMATED COUNT: 11.2 % (ref 11.6–15.1)
FLUAV RNA RESP QL NAA+PROBE: NEGATIVE
FLUBV RNA RESP QL NAA+PROBE: NEGATIVE
GFR SERPL CREATININE-BSD FRML MDRD: 63 ML/MIN/1.73SQ M
GLUCOSE SERPL-MCNC: 104 MG/DL (ref 65–140)
HCO3 BLDV-SCNC: 26.4 MMOL/L (ref 24–30)
HCT VFR BLD AUTO: 48.2 % (ref 34.8–46.1)
HGB BLD-MCNC: 15.9 G/DL (ref 11.5–15.4)
IMM GRANULOCYTES # BLD AUTO: 0.04 THOUSAND/UL (ref 0–0.2)
IMM GRANULOCYTES NFR BLD AUTO: 1 % (ref 0–2)
INR PPP: 0.83 (ref 0.84–1.19)
LACTATE SERPL-SCNC: 1 MMOL/L (ref 0.5–2)
LYMPHOCYTES # BLD AUTO: 1.63 THOUSANDS/ÂΜL (ref 0.6–4.47)
LYMPHOCYTES NFR BLD AUTO: 21 % (ref 14–44)
MCH RBC QN AUTO: 30.8 PG (ref 26.8–34.3)
MCHC RBC AUTO-ENTMCNC: 33 G/DL (ref 31.4–37.4)
MCV RBC AUTO: 93 FL (ref 82–98)
MONOCYTES # BLD AUTO: 0.61 THOUSAND/ÂΜL (ref 0.17–1.22)
MONOCYTES NFR BLD AUTO: 8 % (ref 4–12)
NEUTROPHILS # BLD AUTO: 5.03 THOUSANDS/ÂΜL (ref 1.85–7.62)
NEUTS SEG NFR BLD AUTO: 64 % (ref 43–75)
NRBC BLD AUTO-RTO: 0 /100 WBCS
O2 CT BLDV-SCNC: 20.4 ML/DL
P AXIS: 77 DEGREES
PCO2 BLDV: 45.4 MM HG (ref 42–50)
PH BLDV: 7.38 [PH] (ref 7.3–7.4)
PLATELET # BLD AUTO: 223 THOUSANDS/UL (ref 149–390)
PMV BLD AUTO: 9.6 FL (ref 8.9–12.7)
PO2 BLDV: 56.9 MM HG (ref 35–45)
POTASSIUM SERPL-SCNC: 4.1 MMOL/L (ref 3.5–5.3)
PR INTERVAL: 138 MS
PROCALCITONIN SERPL-MCNC: <0.05 NG/ML
PROT SERPL-MCNC: 6.8 G/DL (ref 6.4–8.4)
PROTHROMBIN TIME: 11.9 SECONDS (ref 11.6–14.5)
QRS AXIS: 77 DEGREES
QRSD INTERVAL: 72 MS
QT INTERVAL: 342 MS
QTC INTERVAL: 425 MS
RBC # BLD AUTO: 5.17 MILLION/UL (ref 3.81–5.12)
RSV RNA RESP QL NAA+PROBE: NEGATIVE
SARS-COV-2 RNA RESP QL NAA+PROBE: NEGATIVE
SODIUM SERPL-SCNC: 139 MMOL/L (ref 135–147)
T WAVE AXIS: 89 DEGREES
VENTRICULAR RATE: 93 BPM
WBC # BLD AUTO: 7.78 THOUSAND/UL (ref 4.31–10.16)

## 2023-12-18 PROCEDURE — 84484 ASSAY OF TROPONIN QUANT: CPT | Performed by: EMERGENCY MEDICINE

## 2023-12-18 PROCEDURE — 85730 THROMBOPLASTIN TIME PARTIAL: CPT | Performed by: EMERGENCY MEDICINE

## 2023-12-18 PROCEDURE — 36415 COLL VENOUS BLD VENIPUNCTURE: CPT | Performed by: EMERGENCY MEDICINE

## 2023-12-18 PROCEDURE — 85379 FIBRIN DEGRADATION QUANT: CPT | Performed by: EMERGENCY MEDICINE

## 2023-12-18 PROCEDURE — 99285 EMERGENCY DEPT VISIT HI MDM: CPT | Performed by: EMERGENCY MEDICINE

## 2023-12-18 PROCEDURE — 71046 X-RAY EXAM CHEST 2 VIEWS: CPT

## 2023-12-18 PROCEDURE — 94760 N-INVAS EAR/PLS OXIMETRY 1: CPT

## 2023-12-18 PROCEDURE — 96375 TX/PRO/DX INJ NEW DRUG ADDON: CPT

## 2023-12-18 PROCEDURE — 0241U HB NFCT DS VIR RESP RNA 4 TRGT: CPT | Performed by: EMERGENCY MEDICINE

## 2023-12-18 PROCEDURE — 85610 PROTHROMBIN TIME: CPT | Performed by: EMERGENCY MEDICINE

## 2023-12-18 PROCEDURE — 87040 BLOOD CULTURE FOR BACTERIA: CPT | Performed by: EMERGENCY MEDICINE

## 2023-12-18 PROCEDURE — 99285 EMERGENCY DEPT VISIT HI MDM: CPT

## 2023-12-18 PROCEDURE — 94640 AIRWAY INHALATION TREATMENT: CPT

## 2023-12-18 PROCEDURE — 94664 DEMO&/EVAL PT USE INHALER: CPT

## 2023-12-18 PROCEDURE — 85025 COMPLETE CBC W/AUTO DIFF WBC: CPT | Performed by: EMERGENCY MEDICINE

## 2023-12-18 PROCEDURE — 96365 THER/PROPH/DIAG IV INF INIT: CPT

## 2023-12-18 PROCEDURE — 93005 ELECTROCARDIOGRAM TRACING: CPT

## 2023-12-18 PROCEDURE — 80053 COMPREHEN METABOLIC PANEL: CPT | Performed by: EMERGENCY MEDICINE

## 2023-12-18 PROCEDURE — 83605 ASSAY OF LACTIC ACID: CPT | Performed by: EMERGENCY MEDICINE

## 2023-12-18 PROCEDURE — 99222 1ST HOSP IP/OBS MODERATE 55: CPT | Performed by: INTERNAL MEDICINE

## 2023-12-18 PROCEDURE — 84145 PROCALCITONIN (PCT): CPT | Performed by: EMERGENCY MEDICINE

## 2023-12-18 PROCEDURE — 82805 BLOOD GASES W/O2 SATURATION: CPT | Performed by: EMERGENCY MEDICINE

## 2023-12-18 RX ORDER — MONTELUKAST SODIUM 10 MG/1
10 TABLET ORAL
Status: DISCONTINUED | OUTPATIENT
Start: 2023-12-18 | End: 2023-12-19 | Stop reason: HOSPADM

## 2023-12-18 RX ORDER — MAGNESIUM SULFATE HEPTAHYDRATE 40 MG/ML
2 INJECTION, SOLUTION INTRAVENOUS ONCE
Status: COMPLETED | OUTPATIENT
Start: 2023-12-18 | End: 2023-12-18

## 2023-12-18 RX ORDER — ENOXAPARIN SODIUM 100 MG/ML
40 INJECTION SUBCUTANEOUS DAILY
Status: DISCONTINUED | OUTPATIENT
Start: 2023-12-18 | End: 2023-12-19 | Stop reason: HOSPADM

## 2023-12-18 RX ORDER — DIPHENHYDRAMINE HCL 25 MG
50 TABLET ORAL
Status: DISCONTINUED | OUTPATIENT
Start: 2023-12-18 | End: 2023-12-19 | Stop reason: HOSPADM

## 2023-12-18 RX ORDER — BUDESONIDE AND FORMOTEROL FUMARATE DIHYDRATE 80; 4.5 UG/1; UG/1
2 AEROSOL RESPIRATORY (INHALATION)
Status: DISCONTINUED | OUTPATIENT
Start: 2023-12-18 | End: 2023-12-19 | Stop reason: HOSPADM

## 2023-12-18 RX ORDER — ALBUTEROL SULFATE 2.5 MG/3ML
5 SOLUTION RESPIRATORY (INHALATION) ONCE
Status: COMPLETED | OUTPATIENT
Start: 2023-12-18 | End: 2023-12-18

## 2023-12-18 RX ORDER — METHYLPREDNISOLONE SODIUM SUCCINATE 40 MG/ML
40 INJECTION, POWDER, LYOPHILIZED, FOR SOLUTION INTRAMUSCULAR; INTRAVENOUS EVERY 12 HOURS SCHEDULED
Status: DISCONTINUED | OUTPATIENT
Start: 2023-12-19 | End: 2023-12-19 | Stop reason: HOSPADM

## 2023-12-18 RX ORDER — LISINOPRIL 20 MG/1
20 TABLET ORAL DAILY
Status: DISCONTINUED | OUTPATIENT
Start: 2023-12-18 | End: 2023-12-19 | Stop reason: HOSPADM

## 2023-12-18 RX ORDER — ALBUTEROL SULFATE 2.5 MG/3ML
1.25 SOLUTION RESPIRATORY (INHALATION) EVERY 6 HOURS PRN
Status: DISCONTINUED | OUTPATIENT
Start: 2023-12-18 | End: 2023-12-19 | Stop reason: HOSPADM

## 2023-12-18 RX ORDER — METHYLPREDNISOLONE SODIUM SUCCINATE 125 MG/2ML
125 INJECTION, POWDER, LYOPHILIZED, FOR SOLUTION INTRAMUSCULAR; INTRAVENOUS ONCE
Status: COMPLETED | OUTPATIENT
Start: 2023-12-18 | End: 2023-12-18

## 2023-12-18 RX ADMIN — ENOXAPARIN SODIUM 40 MG: 40 INJECTION SUBCUTANEOUS at 17:30

## 2023-12-18 RX ADMIN — LISINOPRIL 20 MG: 20 TABLET ORAL at 17:30

## 2023-12-18 RX ADMIN — DIPHENHYDRAMINE HYDROCHLORIDE 50 MG: 25 TABLET ORAL at 22:25

## 2023-12-18 RX ADMIN — METHYLPREDNISOLONE SODIUM SUCCINATE 125 MG: 125 INJECTION, POWDER, FOR SOLUTION INTRAMUSCULAR; INTRAVENOUS at 11:15

## 2023-12-18 RX ADMIN — ALBUTEROL SULFATE 2.5 MG: 2.5 SOLUTION RESPIRATORY (INHALATION) at 19:59

## 2023-12-18 RX ADMIN — ALBUTEROL SULFATE 5 MG: 2.5 SOLUTION RESPIRATORY (INHALATION) at 12:55

## 2023-12-18 RX ADMIN — MAGNESIUM SULFATE HEPTAHYDRATE 2 G: 40 INJECTION, SOLUTION INTRAVENOUS at 11:19

## 2023-12-18 RX ADMIN — MONTELUKAST 10 MG: 10 TABLET, FILM COATED ORAL at 22:25

## 2023-12-18 RX ADMIN — ALBUTEROL SULFATE 5 MG: 2.5 SOLUTION RESPIRATORY (INHALATION) at 11:12

## 2023-12-18 RX ADMIN — IPRATROPIUM BROMIDE 0.5 MG: 0.5 SOLUTION RESPIRATORY (INHALATION) at 11:12

## 2023-12-18 RX ADMIN — IPRATROPIUM BROMIDE 0.5 MG: 0.5 SOLUTION RESPIRATORY (INHALATION) at 12:55

## 2023-12-18 NOTE — PLAN OF CARE

## 2023-12-18 NOTE — ASSESSMENT & PLAN NOTE
Patient presented after having worsening shortness of breath for the past 2 to 3 days.  She has a history of having asthma exacerbations however she has not had any recent exacerbations over the past year.  Patient reports having a mild cough and congestion for the past several days as well.    Currently not requiring supplemental O2 and satting well on room air  VBG showed pH 7.38, CO2 45, bicarb 26.4  D-dimer 0.34  Procalcitonin less than 0.05  Lactic acid 1.0 WBC within normal limits  COVID/flu/RSV negative  Chest x-ray showed no acute cardiopulmonary pathology per my read, awaiting official read  Was given 125 mg of Solu-Medrol in the ED, will transition to 40 mg Solu-Medrol in a.m., will continue with nebulized treatments and magnesium

## 2023-12-18 NOTE — ASSESSMENT & PLAN NOTE
Likely viral however tested negative for COVID/flu/RSV  No evidence found on chest x-ray  Procalcitonin less than 0.05  WBC within normal

## 2023-12-18 NOTE — ASSESSMENT & PLAN NOTE
BP on admission 160/74  Patient reports taking 20 mg of lisinopril daily however did not take medication today  We will continue with 20 mg lisinopril

## 2023-12-18 NOTE — ED PROVIDER NOTES
History  Chief Complaint   Patient presents with    Shortness of Breath    Asthma     Reports symptoms for the past 4 days. Hypoxic episodes at home. Nebs and inhaler provided little relief. +wheezing     74 y/o female, hx of asthma, presents to the ED for sob x 1 week. Patient states that she has had cough and congestion x 1 week.  States that she got her RSV and shingles vaccines a week ago and thought that she had flulike symptoms due to the vaccines.  She states that over the last couple of days her shortness of breath has worsened.  States that she has been using her nebulizer machine more frequently.  Also states that she is taking her pulse ox at home and it has gone down to 88%.  She denies any history of oxygen use at home.  She states that she has been admitted in the past for her asthma and has been intubated twice.  Denies any recent antibiotics or steroids.  She denies any known sick contacts.  No other complaints.      History provided by:  Patient  Shortness of Breath  Severity:  Moderate  Onset quality:  Sudden  Timing:  Constant  Progression:  Worsening  Chronicity:  Recurrent  Relieved by:  None tried  Worsened by:  Nothing  Ineffective treatments:  Inhaler  Associated symptoms: cough    Associated symptoms: no abdominal pain, no chest pain, no ear pain, no fever, no headaches, no neck pain, no rash, no sore throat, no vomiting and no wheezing        Prior to Admission Medications   Prescriptions Last Dose Informant Patient Reported? Taking?   albuterol (ACCUNEB) 1.25 MG/3ML nebulizer solution   No No   Sig: Take 3 mL (1.25 mg total) by nebulization every 6 (six) hours as needed for wheezing or shortness of breath   azithromycin (ZITHROMAX) 250 mg tablet   No No   Sig: Take 500 mg day 1, 250 mg days 2-5   predniSONE 10 mg tablet   No No   Sig: Take 1 tablet (10 mg total) by mouth daily 6 tabs for 3 days and then decrease by one tab every 3 days until complete   predniSONE 20 mg tablet   No No    Sig: Take 60 mg by mouth daily 4 days, 40 mg by mouth daily 4 days, 20 mg by mouth daily 4 days   predniSONE 20 mg tablet   No No   Sig: Take 40 mg (2 pills) for 5 days, 20 mg (1 pill) for 5 days, and 10 mg (1/2 pill) for 5 days   predniSONE 20 mg tablet   No No   Sig: Take 60 mg by mouth daily 4 days, 40 mg by mouth daily 4 days, 20 mg by mouth daily 4 days      Facility-Administered Medications: None       Past Medical History:   Diagnosis Date    Asthma     Hypertension        Past Surgical History:   Procedure Laterality Date    APPENDECTOMY         History reviewed. No pertinent family history.  I have reviewed and agree with the history as documented.    E-Cigarette/Vaping     E-Cigarette/Vaping Substances     Social History     Tobacco Use    Smoking status: Former    Smokeless tobacco: Never   Substance Use Topics    Alcohol use: No    Drug use: No       Review of Systems   Constitutional:  Negative for chills and fever.   HENT:  Negative for congestion, ear pain and sore throat.    Eyes:  Negative for pain and visual disturbance.   Respiratory:  Positive for cough and shortness of breath. Negative for wheezing.    Cardiovascular:  Negative for chest pain and leg swelling.   Gastrointestinal:  Negative for abdominal pain, diarrhea, nausea and vomiting.   Genitourinary:  Negative for dysuria, frequency, hematuria and urgency.   Musculoskeletal:  Negative for neck pain and neck stiffness.   Skin:  Negative for rash and wound.   Neurological:  Negative for weakness, numbness and headaches.   Psychiatric/Behavioral:  Negative for agitation and confusion.    All other systems reviewed and are negative.      Physical Exam  Physical Exam  Vitals and nursing note reviewed.   Constitutional:       Appearance: She is well-developed.   HENT:      Head: Normocephalic and atraumatic.   Eyes:      Pupils: Pupils are equal, round, and reactive to light.   Cardiovascular:      Rate and Rhythm: Normal rate and regular  rhythm.   Pulmonary:      Effort: Pulmonary effort is normal.      Breath sounds: Examination of the right-upper field reveals wheezing. Examination of the left-upper field reveals wheezing. Examination of the right-middle field reveals wheezing. Examination of the left-middle field reveals wheezing. Examination of the right-lower field reveals wheezing. Examination of the left-lower field reveals wheezing. Wheezing present.   Abdominal:      General: Bowel sounds are normal.      Palpations: Abdomen is soft.   Musculoskeletal:         General: Normal range of motion.      Cervical back: Normal range of motion and neck supple.   Skin:     General: Skin is warm and dry.   Neurological:      General: No focal deficit present.      Mental Status: She is alert and oriented to person, place, and time.      Comments: No focal deficits         Vital Signs  ED Triage Vitals   Temperature Pulse Respirations Blood Pressure SpO2   12/18/23 1027 12/18/23 1027 12/18/23 1027 12/18/23 1027 12/18/23 1027   97.6 °F (36.4 °C) (!) 109 17 146/87 95 %      Temp Source Heart Rate Source Patient Position - Orthostatic VS BP Location FiO2 (%)   12/18/23 1027 12/18/23 1027 12/18/23 1027 12/18/23 1027 --   Temporal Monitor Sitting Left arm       Pain Score       12/18/23 1113       No Pain           Vitals:    12/18/23 1130 12/18/23 1200 12/18/23 1230 12/18/23 1300   BP: (!) 186/81 156/69 157/78 160/74   Pulse: 85 81 92 84   Patient Position - Orthostatic VS:             Visual Acuity      ED Medications  Medications   albuterol inhalation solution 5 mg (5 mg Nebulization Given 12/18/23 1112)   ipratropium (ATROVENT) 0.02 % inhalation solution 0.5 mg (0.5 mg Nebulization Given 12/18/23 1112)   magnesium sulfate 2 g/50 mL IVPB (premix) 2 g (0 g Intravenous Stopped 12/18/23 1253)   methylPREDNISolone sodium succinate (Solu-MEDROL) injection 125 mg (125 mg Intravenous Given 12/18/23 1115)   albuterol inhalation solution 5 mg (5 mg Nebulization  Given 12/18/23 1255)   ipratropium (ATROVENT) 0.02 % inhalation solution 0.5 mg (0.5 mg Nebulization Given 12/18/23 1255)       Diagnostic Studies  Results Reviewed       Procedure Component Value Units Date/Time    HS Troponin I 2hr [[id number]]  (Normal) Collected: 12/18/23 1300    Lab Status: Final result Specimen: Blood from Arm, Right Updated: 12/18/23 1335     hs TnI 2hr 2 ng/L      Delta 2hr hsTnI -1 ng/L     FLU/RSV/COVID - if FLU/RSV clinically relevant [[id number]]  (Normal) Collected: 12/18/23 1106    Lab Status: Final result Specimen: Nares from Nose Updated: 12/18/23 1254     SARS-CoV-2 Negative     INFLUENZA A PCR Negative     INFLUENZA B PCR Negative     RSV PCR Negative    Narrative:      FOR PEDIATRIC PATIENTS - copy/paste COVID Guidelines URL to browser: [email or web address]    SARS-CoV-2 assay is a Nucleic Acid Amplification assay intended for the  qualitative detection of nucleic acid from SARS-CoV-2 in nasopharyngeal  swabs. Results are for the presumptive identification of SARS-CoV-2 RNA.    Positive results are indicative of infection with SARS-CoV-2, the virus  causing COVID-19, but do not rule out bacterial infection or co-infection  with other viruses. Laboratories within the [place] and its  territories are required to report all positive results to the appropriate  public health authorities. Negative results do not preclude SARS-CoV-2  infection and should not be used as the sole basis for treatment or other  patient management decisions. Negative results must be combined with  clinical observations, patient history, and epidemiological information.  This test has not been FDA cleared or approved.    This test has been authorized by FDA under an Emergency Use Authorization  (EUA). This test is only authorized for the duration of time the  declaration that circumstances exist justifying the authorization of the  emergency use of an  in vitro diagnostic tests for detection of SARS-CoV-2  virus and/or diagnosis of COVID-19 infection under section 564(b)(1) of  the Act, 21 U.S.C. 360bbb-3(b)(1), unless the authorization is terminated  or revoked sooner. The test has been validated but independent review by FDA  and CLIA is pending.    Test performed using [employer] GeneXpert: This RT-PCR assay targets N2,  a region unique to SARS-CoV-2. A conserved region in the E-gene was chosen  for pan-Sarbecovirus detection which includes SARS-CoV-2.    According to CMS-2020-01-R, this platform meets the definition of high-throughput technology.    HS Troponin I 4hr [[id number]]     Lab Status: No result Specimen: Blood     Procalcitonin [[id number]]  (Normal) Collected: 12/18/23 1106    Lab Status: Final result Specimen: Blood from Arm, Left Updated: 12/18/23 1148     Procalcitonin <0.05 ng/ml     HS Troponin 0hr (reflex protocol) [[id number]]  (Normal) Collected: 12/18/23 1106    Lab Status: Final result Specimen: Blood from Arm, Left Updated: 12/18/23 1145     hs TnI 0hr 3 ng/L     Blood culture #1 [[id number]] Collected: 12/18/23 1129    Lab Status: In process Specimen: Blood from Arm, Right Updated: 12/18/23 1134    Blood gas, venous [[id number]]  (Abnormal) Collected: 12/18/23 1106    Lab Status: Final result Specimen: Blood from Arm, Left Updated: 12/18/23 1134     pH, Jamar 7.383     pCO2, Jamar 45.4 mm Hg      pO2, Jamar 56.9 mm Hg      HCO3, Jamar 26.4 mmol/L      Base Excess, Jamar 0.8 mmol/L      O2 Content, Jamar 20.4 ml/dL      O2 HGB, VENOUS 87.0 %     Lactic acid [[id number]]  (Normal) Collected: 12/18/23 1106    Lab Status: Final result Specimen: Blood from Arm, Left Updated: 12/18/23 1133     LACTIC ACID 1.0 mmol/L     Narrative:      Result may be elevated if tourniquet was used during collection.    Comprehensive metabolic panel [[id number]] Collected: 12/18/23 1106    Lab Status: Final result Specimen: Blood from Arm, Left Updated: 12/18/23 1133     Sodium 139  mmol/L      Potassium 4.1 mmol/L      Chloride 105 mmol/L      CO2 28 mmol/L      ANION GAP 6 mmol/L      BUN 9 mg/dL      Creatinine 0.90 mg/dL      Glucose 104 mg/dL      Calcium 9.3 mg/dL      AST 18 U/L      ALT 17 U/L      Alkaline Phosphatase 65 U/L      Total Protein 6.8 g/dL      Albumin 4.5 g/dL      Total Bilirubin 0.69 mg/dL      eGFR 63 ml/min/1.73sq m     Narrative:      National Kidney Disease Foundation guidelines for Chronic Kidney Disease (CKD):     Stage 1 with normal or high GFR (GFR > 90 mL/min/1.73 square meters)    Stage 2 Mild CKD (GFR = 60-89 mL/min/1.73 square meters)    Stage 3A Moderate CKD (GFR = 45-59 mL/min/1.73 square meters)    Stage 3B Moderate CKD (GFR = 30-44 mL/min/1.73 square meters)    Stage 4 Severe CKD (GFR = 15-29 mL/min/1.73 square meters)    Stage 5 End Stage CKD (GFR <15 mL/min/1.73 square meters)  Note: GFR calculation is accurate only with a steady state creatinine    D-dimer, quantitative [[id number]]  (Normal) Collected: 12/18/23 1106    Lab Status: Final result Specimen: Blood from Arm, Left Updated: 12/18/23 1132     D-Dimer, Quant 0.34 ug/ml FEU     Narrative:      In the evaluation for possible pulmonary embolism, in the appropriate (Well's Score of 4 or less) patient, the age adjusted d-dimer cutoff for this patient can be calculated as:    Age x 0.01 (in ug/mL) for Age-adjusted D-dimer exclusion threshold for a patient over 50 years.    Protime-INR [[id number]]  (Abnormal) Collected: 12/18/23 1106    Lab Status: Final result Specimen: Blood from Arm, Left Updated: 12/18/23 1131     Protime 11.9 seconds      INR 0.83    APTT [[id number]]  (Normal) Collected: 12/18/23 1106    Lab Status: Final result Specimen: Blood from Arm, Left Updated: 12/18/23 1131     PTT 28 seconds     CBC and differential [[id number]]  (Abnormal) Collected: 12/18/23 1106    Lab Status: Final result Specimen: Blood from Arm, Left Updated: 12/18/23 1115     WBC 7.78 Thousand/uL      RBC 5.17  Million/uL      Hemoglobin 15.9 g/dL      Hematocrit 48.2 %      MCV 93 fL      MCH 30.8 pg      MCHC 33.0 g/dL      RDW 11.2 %      MPV 9.6 fL      Platelets 223 Thousands/uL      nRBC 0 /100 WBCs      Neutrophils Relative 64 %      Immat GRANS % 1 %      Lymphocytes Relative 21 %      Monocytes Relative 8 %      Eosinophils Relative 5 %      Basophils Relative 1 %      Neutrophils Absolute 5.03 Thousands/µL      Immature Grans Absolute 0.04 Thousand/uL      Lymphocytes Absolute 1.63 Thousands/µL      Monocytes Absolute 0.61 Thousand/µL      Eosinophils Absolute 0.39 Thousand/µL      Basophils Absolute 0.08 Thousands/µL     Blood culture #2 [[id number]] Collected: 12/18/23 1106    Lab Status: In process Specimen: Blood from Arm, Left Updated: 12/18/23 1111    UA w Reflex to Microscopic w Reflex to Culture [[id number]]     Lab Status: No result Specimen: Urine                    XR chest 2 views    (Results Pending)              Procedures  ECG 12 Lead Documentation Only    Date/Time: 12/18/2023 10:58 AM    Performed by: Della Frankel DO  Authorized by: Della Frankel DO    Indications / Diagnosis:  Sob  Patient location:  ED  Previous ECG:     Previous ECG:  Compared to current    Similarity:  No change  Rate:     ECG rate:  93    ECG rate assessment: normal    Rhythm:     Rhythm: sinus rhythm    Ectopy:     Ectopy: none    QRS:     QRS axis:  Normal    QRS intervals:  Normal  Conduction:     Conduction: normal    ST segments:     ST segments:  Normal  T waves:     T waves: normal             ED Course  ED Course as of 12/18/23 1402   Mon Dec 18, 2023   1033 Sob, palpitations, cough, over the last 5 days. Did get 2 vaccines and initially thought it was from that. Sob worse today. Hurts to breath. Worse then normal asthma. Neb treatments. No steroids or abx.    1154 D-Dimer, Quant: 0.34                               SBIRT 22yo+      Flowsheet Row Most Recent Value   Initial Alcohol Screen: US AUDIT-C     1. How  often do you have a drink containing alcohol? 0 Filed at: 12/18/2023 1034   2. How many drinks containing alcohol do you have on a typical day you are drinking?  0 Filed at: 12/18/2023 1034   3b. FEMALE Any Age, or MALE 65+: How often do you have 4 or more drinks on one occassion? 0 Filed at: 12/18/2023 1034   Audit-C Score 0 Filed at: 12/18/2023 1034   RAMON: How many times in the past year have you...    Used an illegal drug or used a prescription medication for non-medical reasons? Never Filed at: 12/18/2023 1034                      Medical Decision Making  72 y/o female coming in for son- will get labs, vbg, cxr, covid/flu/ rsv, ddimer, and give breathing tx, steroids, and mag. Will admit.     Amount and/or Complexity of Data Reviewed  Labs: ordered. Decision-making details documented in ED Course.  Radiology: ordered.    Risk  Prescription drug management.  Decision regarding hospitalization.             Disposition  Final diagnoses:   Asthma exacerbation     Time reflects when diagnosis was documented in both MDM as applicable and the Disposition within this note       Time User Action Codes Description Comment    12/18/2023 12:32 PM Della Frankel Add [J45.901] Asthma exacerbation           ED Disposition       ED Disposition   Admit    Condition   Stable    Date/Time   Mon Dec 18, 2023 1232    Comment   Case was discussed with AARON and the patient's admission status was agreed to be Admission Status: observation status to the service of Dr. Eubanks .               Follow-up Information    None         Patient's Medications   Discharge Prescriptions    No medications on file       No discharge procedures on file.    PDMP Review       None            ED Provider  Electronically Signed by             Della Frankel DO  12/18/23 6136

## 2023-12-19 VITALS
RESPIRATION RATE: 17 BRPM | OXYGEN SATURATION: 93 % | DIASTOLIC BLOOD PRESSURE: 69 MMHG | WEIGHT: 177.47 LBS | TEMPERATURE: 97.6 F | BODY MASS INDEX: 26.9 KG/M2 | SYSTOLIC BLOOD PRESSURE: 143 MMHG | HEIGHT: 68 IN | HEART RATE: 76 BPM

## 2023-12-19 LAB
ANION GAP SERPL CALCULATED.3IONS-SCNC: 8 MMOL/L
BUN SERPL-MCNC: 11 MG/DL (ref 5–25)
CALCIUM SERPL-MCNC: 9.6 MG/DL (ref 8.4–10.2)
CHLORIDE SERPL-SCNC: 104 MMOL/L (ref 96–108)
CO2 SERPL-SCNC: 25 MMOL/L (ref 21–32)
CREAT SERPL-MCNC: 0.77 MG/DL (ref 0.6–1.3)
GFR SERPL CREATININE-BSD FRML MDRD: 76 ML/MIN/1.73SQ M
GLUCOSE P FAST SERPL-MCNC: 133 MG/DL (ref 65–99)
GLUCOSE SERPL-MCNC: 133 MG/DL (ref 65–140)
POTASSIUM SERPL-SCNC: 4.5 MMOL/L (ref 3.5–5.3)
SODIUM SERPL-SCNC: 137 MMOL/L (ref 135–147)

## 2023-12-19 PROCEDURE — 80048 BASIC METABOLIC PNL TOTAL CA: CPT

## 2023-12-19 PROCEDURE — 99239 HOSP IP/OBS DSCHRG MGMT >30: CPT

## 2023-12-19 RX ORDER — PREDNISONE 10 MG/1
TABLET ORAL DAILY
Qty: 30 TABLET | Refills: 0 | Status: SHIPPED | OUTPATIENT
Start: 2023-12-19 | End: 2023-12-31

## 2023-12-19 RX ADMIN — METHYLPREDNISOLONE SODIUM SUCCINATE 40 MG: 40 INJECTION, POWDER, FOR SOLUTION INTRAMUSCULAR; INTRAVENOUS at 10:10

## 2023-12-19 RX ADMIN — LISINOPRIL 20 MG: 20 TABLET ORAL at 10:10

## 2023-12-19 RX ADMIN — ENOXAPARIN SODIUM 40 MG: 40 INJECTION SUBCUTANEOUS at 10:10

## 2023-12-19 NOTE — PLAN OF CARE
Problem: PAIN - ADULT  Goal: Verbalizes/displays adequate comfort level or baseline comfort level  Description: Interventions:  - Encourage patient to monitor pain and request assistance  - Assess pain using appropriate pain scale  - Administer analgesics based on type and severity of pain and evaluate response  - Implement non-pharmacological measures as appropriate and evaluate response  - Consider cultural and social influences on pain and pain management  - Notify physician/advanced practitioner if interventions unsuccessful or patient reports new pain  Outcome: Progressing     Problem: INFECTION - ADULT  Goal: Absence or prevention of progression during hospitalization  Description: INTERVENTIONS:  - Assess and monitor for signs and symptoms of infection  - Monitor lab/diagnostic results  - Monitor all insertion sites, i.e. indwelling lines, tubes, and drains  - Monitor endotracheal if appropriate and nasal secretions for changes in amount and color  - [place] appropriate cooling/warming therapies per order  - Administer medications as ordered  - Instruct and encourage patient and family to use good hand hygiene technique  - Identify and instruct in appropriate isolation precautions for identified infection/condition  Outcome: Progressing  Goal: Absence of fever/infection during neutropenic period  Description: INTERVENTIONS:  - Monitor WBC    Outcome: Progressing     Problem: SAFETY ADULT  Goal: Maintain or return to baseline ADL function  Description: INTERVENTIONS:  -  Assess patient's ability to carry out ADLs; assess patient's baseline for ADL function and identify physical deficits which impact ability to perform ADLs (bathing, care of mouth/teeth, toileting, grooming, dressing, etc.)  - Assess/evaluate cause of self-care deficits   - Assess range of motion  - Assess patient's mobility; develop plan if impaired  - Assess patient's need for assistive devices and provide as appropriate  - Encourage  maximum independence but intervene and supervise when necessary  - Involve family in performance of ADLs  - Assess for home care needs following discharge   - Consider OT consult to assist with ADL evaluation and planning for discharge  - Provide patient education as appropriate  Outcome: Progressing     Problem: DISCHARGE PLANNING  Goal: Discharge to home or other facility with appropriate resources  Description: INTERVENTIONS:  - Identify barriers to discharge w/patient and caregiver  - Arrange for needed discharge resources and transportation as appropriate  - Identify discharge learning needs (meds, wound care, etc.)  - Arrange for interpretive services to assist at discharge as needed  - Refer to Case Management Department for coordinating discharge planning if the patient needs post-hospital services based on physician/advanced practitioner order or complex needs related to functional status, cognitive ability, or social support system  Outcome: Progressing     Problem: Knowledge Deficit  Goal: Patient/family/caregiver demonstrates understanding of disease process, treatment plan, medications, and discharge instructions  Description: Complete learning assessment and assess knowledge base.  Interventions:  - Provide teaching at level of understanding  - Provide teaching via preferred learning methods  Outcome: Progressing

## 2023-12-19 NOTE — DISCHARGE SUMMARY
[facility]  Discharge- Veronika Portillo 1950, 73 y.o. female MRN: [id number]  Unit/Bed#: -[facility] Encounter: [id number]  Primary Care Provider: Trevor Walton MD   Date and time admitted to hospital: 12/18/2023 10:29 AM    * Asthma exacerbation  Assessment & Plan  Patient presented after having worsening shortness of breath for the past 2 to 3 days.  She has a history of having asthma exacerbations however she has not had any recent exacerbations over the past year.  Patient reports having a mild cough and congestion for the past several days as well.    Currently not requiring supplemental O2 and satting well on room air  VBG showed pH 7.38, CO2 45, bicarb 26.4  D-dimer 0.34  Procalcitonin less than 0.05  Lactic acid 1.0 WBC within normal limits  COVID/flu/RSV negative  Chest x-ray showed no acute cardiopulmonary pathology per my read, awaiting official read  Was given 125 mg of Solu-Medrol in the ED, will transition to 40 mg Solu-Medrol in a.m., will continue with nebulized treatments and magnesium  , Feels significant improved and would like to be discharged, will be sent home with a prednisone taper    URI (upper respiratory infection)  Assessment & Plan  Likely viral however tested negative for COVID/flu/RSV  No evidence found on chest x-ray  Procalcitonin less than 0.05  WBC within normal    Hypertension  Assessment & Plan   BP on admission 160/74, currently close to normotensive  Patient reports taking 20 mg of lisinopril daily however did not take medication today  We will continue with 20 mg lisinopril      Medical Problems       Resolved Problems  Date Reviewed: 12/20/2017   None       Discharging Physician / Practitioner: Maximus Ruiz PA-C  PCP: Trevor Walton MD  Admission Date:   Admission Orders (From admission, onward)       Ordered        12/18/23 1233  Place in Observation  Once                          Discharge Date: 12/19/23    Consultations During Hospital  "Stay:  None    Procedures Performed:   XR chest 2 views  Impression: No acute cardiopulmonary disease        Significant Findings / Test Results:   D-dimer 0.34  Procalcitonin less than 0.05  Lactic acid 1.0  COVID/flu/RSV negative    Incidental Findings:   None      Test Results Pending at Discharge (will require follow up):   None     Outpatient Tests Requested:  None    Complications: None    Reason for Admission: Acute asthma exacerbation    Hospital Course:   Veronika Portillo is a 73 y.o. female patient who originally presented to the hospital on 12/18/2023 due to acute asthma exacerbation.  The patient presented after 2 to 3 days of worsening shortness of breath.  She has a history of asthma exacerbations.  While in the ED she had a small infectious workup including chest x-ray, procalcitonin, COVID/flu/RSV, and lactic acid.  All of which returned as negative for infectious workup.  Patient was given IV steroids and IV magnesium and responded well.  After 24 hours of observation the patient was still not requiring supplemental O2 was able to be transitioned to an oral prednisone taper.  She will be sent home with a oral prednisone taper and at this time is medically stable for discharge.  Patient is agreeable for plan at discharge.  For further information regards course hospitalization, please see notes attached.      Please see above list of diagnoses and related plan for additional information.     Condition at Discharge: good    Discharge Day Visit / Exam:   Subjective: Patient reports feeling well.  She has mild shortness of breath although significantly improved.  Patient would like to be discharged home.  She denies chest pain/pressure, palpitations, lightheadedness, nausea, or chills.  Vitals: Blood Pressure: 133/67 (12/19/23 1009)  Pulse: 76 (12/19/23 1009)  Temperature: 98.3 °F (36.8 °C) (12/19/23 0732)  Temp Source: Oral (12/18/23 1541)  Respirations: 14 (12/19/23 0732)  Height: 5' 8\" (172.7 cm) " (12/18/23 1541)  Weight - Scale: 80.5 kg (177 lb 7.5 oz) (12/18/23 1541)  SpO2: 93 % (12/19/23 1009)  Exam:   Physical Exam  Vitals and nursing note reviewed.   Constitutional:       Appearance: She is normal weight.   HENT:      Head: Normocephalic.      Nose: Nose normal.      Mouth/Throat:      Mouth: Mucous membranes are moist.      Pharynx: Oropharynx is clear.   Eyes:      General: No scleral icterus.     Conjunctiva/sclera: Conjunctivae normal.      Pupils: Pupils are equal, round, and reactive to light.   Cardiovascular:      Rate and Rhythm: Normal rate and regular rhythm.      Heart sounds: No murmur heard.     No friction rub. No gallop.   Pulmonary:      Effort: Pulmonary effort is normal. No respiratory distress.      Breath sounds: Normal breath sounds. No stridor. No wheezing, rhonchi or rales.   Abdominal:      General: Abdomen is flat.      Palpations: Abdomen is soft.   Musculoskeletal:         General: Normal range of motion.      Cervical back: Normal range of motion and neck supple.      Right lower leg: No edema.      Left lower leg: No edema.   Lymphadenopathy:      Cervical: No cervical adenopathy.   Skin:     General: Skin is warm.      Coloration: Skin is not jaundiced or pale.      Findings: No bruising, erythema or lesion.   Neurological:      General: No focal deficit present.      Mental Status: She is alert and oriented to person, place, and time. Mental status is at baseline.      Cranial Nerves: No cranial nerve deficit.      Motor: No weakness.   Psychiatric:         Mood and Affect: Mood normal.         Behavior: Behavior normal.         Thought Content: Thought content normal.          Discussion with Family: Patient declined call to .     Discharge instructions/Information to patient and family:   See after visit summary for information provided to patient and family.      Provisions for Follow-Up Care:  See after visit summary for information related to follow-up  care and any pertinent home health orders.      Mobility at time of Discharge:   Basic Mobility Inpatient Raw Score: 24  JH-HLM Goal: 8: Walk 250 feet or more  JH-HLM Achieved: 8: Walk 250 feet ot more  HLM Goal achieved. Continue to encourage appropriate mobility.     Disposition:   Home    Planned Readmission: No     Discharge Statement:  I spent 60 minutes discharging the patient. This time was spent on the day of discharge. I had direct contact with the patient on the day of discharge. Greater than 50% of the total time was spent examining patient, answering all patient questions, arranging and discussing plan of care with patient as well as directly providing post-discharge instructions.  Additional time then spent on discharge activities.    Discharge Medications:  See after visit summary for reconciled discharge medications provided to patient and/or family.      **Please Note: This note may have been constructed using a voice recognition system**

## 2023-12-19 NOTE — DISCHARGE INSTR - AVS FIRST PAGE
Please be aware I prescribed a prednisone taper.  Starting tomorrow please take 4 tablets of prednisone for the first 3 days, followed by 3 tablets daily for the following 3 days, followed by 2 tablets daily for the next 3 days, and lastly 1 tablet daily for the final 3 days.  Please follow-up with your PCP within 1 week.

## 2023-12-19 NOTE — NURSING NOTE
Upon removal of iv from lfa, skin tear noted when tape was pulled from an area of fragile skin under the tape. Small amount of bleeding noted. Area cleansed with nss, patted dry, non adhesive dressing applied, covered with gauze and wrapped with kerlex. Patient discharged home; transportation provided by her .

## 2023-12-19 NOTE — ASSESSMENT & PLAN NOTE
Patient presented after having worsening shortness of breath for the past 2 to 3 days.  She has a history of having asthma exacerbations however she has not had any recent exacerbations over the past year.  Patient reports having a mild cough and congestion for the past several days as well.    Currently not requiring supplemental O2 and satting well on room air  VBG showed pH 7.38, CO2 45, bicarb 26.4  D-dimer 0.34  Procalcitonin less than 0.05  Lactic acid 1.0 WBC within normal limits  COVID/flu/RSV negative  Chest x-ray showed no acute cardiopulmonary pathology per my read, awaiting official read  Was given 125 mg of Solu-Medrol in the ED, will transition to 40 mg Solu-Medrol in a.m., will continue with nebulized treatments and magnesium  , Feels significant improved and would like to be discharged, will be sent home with a prednisone taper

## 2023-12-19 NOTE — PLAN OF CARE

## 2023-12-19 NOTE — ASSESSMENT & PLAN NOTE
BP on admission 160/74, currently close to normotensive  Patient reports taking 20 mg of lisinopril daily however did not take medication today  We will continue with 20 mg lisinopril

## 2023-12-23 LAB
BACTERIA BLD CULT: NORMAL
BACTERIA BLD CULT: NORMAL

## 2024-02-19 ENCOUNTER — APPOINTMENT (EMERGENCY)
Dept: RADIOLOGY | Facility: HOSPITAL | Age: 74
End: 2024-02-19
Payer: MEDICARE

## 2024-02-19 ENCOUNTER — HOSPITAL ENCOUNTER (EMERGENCY)
Facility: HOSPITAL | Age: 74
Discharge: HOME | End: 2024-02-19
Attending: EMERGENCY MEDICINE
Payer: MEDICARE

## 2024-02-19 VITALS
OXYGEN SATURATION: 94 % | SYSTOLIC BLOOD PRESSURE: 149 MMHG | RESPIRATION RATE: 22 BRPM | TEMPERATURE: 97.9 F | HEART RATE: 115 BPM | DIASTOLIC BLOOD PRESSURE: 73 MMHG

## 2024-02-19 DIAGNOSIS — J45.901 ASTHMA EXACERBATION: Primary | ICD-10-CM

## 2024-02-19 LAB
2HR DELTA HS TROPONIN: 0 NG/L
ALBUMIN SERPL BCP-MCNC: 4.4 G/DL (ref 3.5–5)
ALP SERPL-CCNC: 60 U/L (ref 34–104)
ALT SERPL W P-5'-P-CCNC: 19 U/L (ref 7–52)
ANION GAP SERPL CALCULATED.3IONS-SCNC: 8 MMOL/L
AST SERPL W P-5'-P-CCNC: 19 U/L (ref 13–39)
ATRIAL RATE: 87 BPM
BASOPHILS # BLD AUTO: 0.12 THOUSANDS/ÂΜL (ref 0–0.1)
BASOPHILS NFR BLD AUTO: 1 % (ref 0–1)
BILIRUB SERPL-MCNC: 0.59 MG/DL (ref 0.2–1)
BNP SERPL-MCNC: 39 PG/ML (ref 0–100)
BUN SERPL-MCNC: 11 MG/DL (ref 5–25)
CALCIUM SERPL-MCNC: 9.3 MG/DL (ref 8.4–10.2)
CARDIAC TROPONIN I PNL SERPL HS: 3 NG/L
CARDIAC TROPONIN I PNL SERPL HS: 3 NG/L
CHLORIDE SERPL-SCNC: 107 MMOL/L (ref 96–108)
CO2 SERPL-SCNC: 25 MMOL/L (ref 21–32)
CREAT SERPL-MCNC: 0.66 MG/DL (ref 0.6–1.3)
D DIMER PPP FEU-MCNC: 0.37 UG/ML FEU
EOSINOPHIL # BLD AUTO: 0.52 THOUSAND/ÂΜL (ref 0–0.61)
EOSINOPHIL NFR BLD AUTO: 5 % (ref 0–6)
ERYTHROCYTE [DISTWIDTH] IN BLOOD BY AUTOMATED COUNT: 11.8 % (ref 11.6–15.1)
GFR SERPL CREATININE-BSD FRML MDRD: 87 ML/MIN/1.73SQ M
GLUCOSE SERPL-MCNC: 112 MG/DL (ref 65–140)
HCT VFR BLD AUTO: 46.8 % (ref 34.8–46.1)
HGB BLD-MCNC: 15.8 G/DL (ref 11.5–15.4)
IMM GRANULOCYTES # BLD AUTO: 0.03 THOUSAND/UL (ref 0–0.2)
IMM GRANULOCYTES NFR BLD AUTO: 0 % (ref 0–2)
LYMPHOCYTES # BLD AUTO: 2.48 THOUSANDS/ÂΜL (ref 0.6–4.47)
LYMPHOCYTES NFR BLD AUTO: 25 % (ref 14–44)
MCH RBC QN AUTO: 31.1 PG (ref 26.8–34.3)
MCHC RBC AUTO-ENTMCNC: 33.8 G/DL (ref 31.4–37.4)
MCV RBC AUTO: 92 FL (ref 82–98)
MONOCYTES # BLD AUTO: 0.62 THOUSAND/ÂΜL (ref 0.17–1.22)
MONOCYTES NFR BLD AUTO: 6 % (ref 4–12)
NEUTROPHILS # BLD AUTO: 6.35 THOUSANDS/ÂΜL (ref 1.85–7.62)
NEUTS SEG NFR BLD AUTO: 63 % (ref 43–75)
NRBC BLD AUTO-RTO: 0 /100 WBCS
P AXIS: 69 DEGREES
PLATELET # BLD AUTO: 222 THOUSANDS/UL (ref 149–390)
PMV BLD AUTO: 9.9 FL (ref 8.9–12.7)
POTASSIUM SERPL-SCNC: 4.2 MMOL/L (ref 3.5–5.3)
PR INTERVAL: 146 MS
PROT SERPL-MCNC: 6.4 G/DL (ref 6.4–8.4)
QRS AXIS: 70 DEGREES
QRSD INTERVAL: 70 MS
QT INTERVAL: 362 MS
QTC INTERVAL: 435 MS
RBC # BLD AUTO: 5.08 MILLION/UL (ref 3.81–5.12)
SODIUM SERPL-SCNC: 140 MMOL/L (ref 135–147)
T WAVE AXIS: 100 DEGREES
VENTRICULAR RATE: 87 BPM
WBC # BLD AUTO: 10.12 THOUSAND/UL (ref 4.31–10.16)

## 2024-02-19 PROCEDURE — 99285 EMERGENCY DEPT VISIT HI MDM: CPT | Performed by: EMERGENCY MEDICINE

## 2024-02-19 PROCEDURE — 71046 X-RAY EXAM CHEST 2 VIEWS: CPT

## 2024-02-19 PROCEDURE — 83880 ASSAY OF NATRIURETIC PEPTIDE: CPT | Performed by: EMERGENCY MEDICINE

## 2024-02-19 PROCEDURE — 80053 COMPREHEN METABOLIC PANEL: CPT | Performed by: EMERGENCY MEDICINE

## 2024-02-19 PROCEDURE — 94644 CONT INHLJ TX 1ST HOUR: CPT

## 2024-02-19 PROCEDURE — 93010 ELECTROCARDIOGRAM REPORT: CPT | Performed by: INTERNAL MEDICINE

## 2024-02-19 PROCEDURE — 96361 HYDRATE IV INFUSION ADD-ON: CPT

## 2024-02-19 PROCEDURE — 94760 N-INVAS EAR/PLS OXIMETRY 1: CPT

## 2024-02-19 PROCEDURE — 99285 EMERGENCY DEPT VISIT HI MDM: CPT

## 2024-02-19 PROCEDURE — 85025 COMPLETE CBC W/AUTO DIFF WBC: CPT | Performed by: EMERGENCY MEDICINE

## 2024-02-19 PROCEDURE — 96360 HYDRATION IV INFUSION INIT: CPT

## 2024-02-19 PROCEDURE — 93005 ELECTROCARDIOGRAM TRACING: CPT

## 2024-02-19 PROCEDURE — 84484 ASSAY OF TROPONIN QUANT: CPT | Performed by: EMERGENCY MEDICINE

## 2024-02-19 PROCEDURE — 85379 FIBRIN DEGRADATION QUANT: CPT | Performed by: EMERGENCY MEDICINE

## 2024-02-19 PROCEDURE — 36415 COLL VENOUS BLD VENIPUNCTURE: CPT | Performed by: EMERGENCY MEDICINE

## 2024-02-19 RX ORDER — ALBUTEROL SULFATE 2.5 MG/3ML
4 SOLUTION RESPIRATORY (INHALATION) ONCE
Status: COMPLETED | OUTPATIENT
Start: 2024-02-19 | End: 2024-02-19

## 2024-02-19 RX ORDER — SODIUM CHLORIDE FOR INHALATION 0.9 %
12 VIAL, NEBULIZER (ML) INHALATION ONCE
Status: COMPLETED | OUTPATIENT
Start: 2024-02-19 | End: 2024-02-19

## 2024-02-19 RX ORDER — MAGNESIUM SULFATE 500 MG/ML
2 VIAL (ML) INJECTION ONCE
Status: COMPLETED | OUTPATIENT
Start: 2024-02-19 | End: 2024-02-19

## 2024-02-19 RX ORDER — METHYLPREDNISOLONE SOD SUCC 125 MG
1 VIAL (EA) INJECTION ONCE
Status: COMPLETED | OUTPATIENT
Start: 2024-02-19 | End: 2024-02-19

## 2024-02-19 RX ORDER — PREDNISONE 20 MG/1
60 TABLET ORAL DAILY
Qty: 15 TABLET | Refills: 0 | Status: SHIPPED | OUTPATIENT
Start: 2024-02-19 | End: 2024-02-24

## 2024-02-19 RX ADMIN — IPRATROPIUM BROMIDE 1 MG: 0.5 SOLUTION RESPIRATORY (INHALATION) at 16:45

## 2024-02-19 RX ADMIN — SODIUM CHLORIDE 1000 ML: 0.9 INJECTION, SOLUTION INTRAVENOUS at 16:43

## 2024-02-19 RX ADMIN — ALBUTEROL SULFATE 10 MG: 2.5 SOLUTION RESPIRATORY (INHALATION) at 16:45

## 2024-02-19 RX ADMIN — ISODIUM CHLORIDE 12 ML: 0.03 SOLUTION RESPIRATORY (INHALATION) at 16:46

## 2024-02-19 NOTE — ED NOTES
"During pt ambulation, they \"felt winded\" O2 sats between 91-93     Melvi Jenkins RN  02/19/24 2325    "

## 2024-02-19 NOTE — ED PROVIDER NOTES
History  Chief Complaint   Patient presents with   • Shortness of Breath     Pt came in EMS reporting SOB x 5 days and cough. Was given albuterol, mag, and solu-medrol EMS.      Patient is a 73-year-old female past medical history with of asthma and hypertension presenting for shortness of breath.  Patient notes 5 to 7 days of gradually worsening shortness of breath which is exertional in nature.  Notes cough productive of yellow nonbloody sputum and denies fevers.  Has been using her nebulizer roughly 2-3 times a day with no relief.  Notes a history of 3 prior intubations but states it was roughly 10 years ago.  Denies any leg pain or swelling, rashes, vision changes, dysuria, nausea vomiting, dizziness, history of blood clots or clotting disorders, cancer diagnosis, recent surgeries or immobilization, use of estrogen products.  Does note pain to the lateral chest wall left greater than right in same timeframe.        Prior to Admission Medications   Prescriptions Last Dose Informant Patient Reported? Taking?   albuterol (ACCUNEB) 1.25 MG/3ML nebulizer solution   No No   Sig: Take 3 mL (1.25 mg total) by nebulization every 6 (six) hours as needed for wheezing or shortness of breath      Facility-Administered Medications: None       Past Medical History:   Diagnosis Date   • Asthma    • Hypertension        Past Surgical History:   Procedure Laterality Date   • APPENDECTOMY         History reviewed. No pertinent family history.  I have reviewed and agree with the history as documented.    E-Cigarette/Vaping     E-Cigarette/Vaping Substances     Social History     Tobacco Use   • Smoking status: Former   • Smokeless tobacco: Never   Substance Use Topics   • Alcohol use: Never   • Drug use: No       Review of Systems   All other systems reviewed and are negative.      Physical Exam  Physical Exam  Vitals reviewed.   Constitutional:       General: She is not in acute distress.     Appearance: Normal appearance. She is  not ill-appearing.   HENT:      Mouth/Throat:      Mouth: Mucous membranes are moist.   Eyes:      Conjunctiva/sclera: Conjunctivae normal.   Cardiovascular:      Rate and Rhythm: Normal rate and regular rhythm.      Pulses: Normal pulses.      Heart sounds: Normal heart sounds.   Pulmonary:      Effort: Pulmonary effort is normal.      Breath sounds: Examination of the right-upper field reveals wheezing. Examination of the left-upper field reveals wheezing. Examination of the right-middle field reveals wheezing. Examination of the left-middle field reveals wheezing. Examination of the right-lower field reveals wheezing. Examination of the left-lower field reveals wheezing. Wheezing present.      Comments: No retractions, accessory muscle use or conversational dyspnea  Abdominal:      General: Abdomen is flat.      Palpations: Abdomen is soft.      Tenderness: There is no abdominal tenderness.   Musculoskeletal:         General: No swelling. Normal range of motion.      Cervical back: Neck supple.      Right lower leg: No tenderness. No edema.      Left lower leg: No tenderness. No edema.   Skin:     General: Skin is warm and dry.   Neurological:      General: No focal deficit present.      Mental Status: She is alert.   Psychiatric:         Mood and Affect: Mood normal.         Vital Signs  ED Triage Vitals [02/19/24 1554]   Temp Pulse Respirations BP SpO2   -- 98 20 -- 96 %      Temp src Heart Rate Source Patient Position - Orthostatic VS BP Location FiO2 (%)   -- Monitor -- -- --      Pain Score       --           Vitals:    02/19/24 1554   Pulse: 98         Visual Acuity      ED Medications  Medications   sodium chloride 0.9 % bolus 1,000 mL (has no administration in time range)   albuterol inhalation solution 10 mg (has no administration in time range)   ipratropium (ATROVENT) 0.02 % inhalation solution 1 mg (has no administration in time range)   sodium chloride 0.9 % inhalation solution 12 mL (has no  administration in time range)   albuterol (FOR EMS ONLY) (2.5 mg/3 mL) 0.083 % inhalation solution 10 mg (0 mg Does not apply Given to EMS 2/19/24 1604)   magnesium sulfate (FOR EMS ONLY) 1 g/2 mL injection 2 g (0 g Does not apply Given to EMS 2/19/24 1604)   methylPREDNISolone sodium succinate (FOR EMS ONLY) (Solu-MEDROL) 125 MG injection 125 mg (0 mg Does not apply Given to EMS 2/19/24 1604)       Diagnostic Studies  Results Reviewed       None                   XR chest 2 views    (Results Pending)              Procedures  ECG 12 Lead Documentation Only    Date/Time: 2/19/2024 4:14 PM    Performed by: Hayde Clements DO  Authorized by: Hayde Clements DO    Patient location:  ED  Previous ECG:     Previous ECG:  Compared to current    Similarity:  No change  Interpretation:     Interpretation: normal    Rate:     ECG rate assessment: normal    Rhythm:     Rhythm: sinus rhythm    Ectopy:     Ectopy: none    QRS:     QRS axis:  Normal    QRS intervals:  Normal  Conduction:     Conduction: normal    ST segments:     ST segments:  Normal  T waves:     T waves: normal             ED Course  ED Course as of 02/19/24 2147   Mon Feb 19, 2024   1743 Workup unremarkable, will obtain ambulatory pulse ox, delta troponin and reevaluated and a brief nebulizer breathing treatment however patient roughly long-term through and noting improvement of symptoms, still with wheezing, will continue to monitor.   1841 Patient tolerated walking pulse ox.   1933 Patient notes improvement of symptoms and states she is ready to be discharged home, discussed return precautions outpatient follow-up and she states she understands.   1936 Patient has pulmonology follow-up this week.                                             Medical Decision Making  Patient is 73-year-old female past medical Struve asthma, hypertension presenting with shortness of breath.  Patient is well-appearing at bedside with stable vitals and in no acute  distress.  She has low-grade tachycardia and oxygen saturation between 91 and 93% on room air with no retractions,  muscle use or conversational dyspnea with diffuse expiratory wheezing, equal pulses, no lower extreme edema and no other significant physical exam findings.  Will obtain labs to assess for electrolyte MIs, anemia, EKG and troponin to assess for ACS, D-dimer to assess pulmonary embolism, x-ray to assess for pneumonia, pneumothorax, give symptomatic management reassess    Amount and/or Complexity of Data Reviewed  Labs: ordered.  Radiology: ordered.    Risk  Prescription drug management.             Disposition  Final diagnoses:   None     ED Disposition       None          Follow-up Information    None         Patient's Medications   Discharge Prescriptions    No medications on file       No discharge procedures on file.    PDMP Review       None            ED Provider  Electronically Signed by             Hayde Clements DO  02/19/24 5086

## 2024-03-09 ENCOUNTER — APPOINTMENT (EMERGENCY)
Dept: RADIOLOGY | Facility: HOSPITAL | Age: 74
End: 2024-03-09
Payer: MEDICARE

## 2024-03-09 ENCOUNTER — HOSPITAL ENCOUNTER (EMERGENCY)
Facility: HOSPITAL | Age: 74
Discharge: HOME | End: 2024-03-09
Attending: EMERGENCY MEDICINE | Admitting: EMERGENCY MEDICINE
Payer: MEDICARE

## 2024-03-09 VITALS
DIASTOLIC BLOOD PRESSURE: 79 MMHG | RESPIRATION RATE: 20 BRPM | BODY MASS INDEX: 25.16 KG/M2 | TEMPERATURE: 98.5 F | WEIGHT: 166 LBS | OXYGEN SATURATION: 97 % | HEIGHT: 68 IN | SYSTOLIC BLOOD PRESSURE: 184 MMHG | HEART RATE: 96 BPM

## 2024-03-09 DIAGNOSIS — J45.901 ASTHMA EXACERBATION: Primary | ICD-10-CM

## 2024-03-09 LAB
ANION GAP SERPL CALCULATED.3IONS-SCNC: 9 MMOL/L
APTT PPP: 30 SECONDS (ref 23–37)
ATRIAL RATE: 98 BPM
BASOPHILS # BLD AUTO: 0.08 THOUSANDS/ÂΜL (ref 0–0.1)
BASOPHILS NFR BLD AUTO: 1 % (ref 0–1)
BNP SERPL-MCNC: 33 PG/ML (ref 0–100)
BUN SERPL-MCNC: 9 MG/DL (ref 5–25)
CALCIUM SERPL-MCNC: 9.4 MG/DL (ref 8.4–10.2)
CARDIAC TROPONIN I PNL SERPL HS: 3 NG/L
CHLORIDE SERPL-SCNC: 105 MMOL/L (ref 96–108)
CO2 SERPL-SCNC: 26 MMOL/L (ref 21–32)
CREAT SERPL-MCNC: 0.79 MG/DL (ref 0.6–1.3)
EOSINOPHIL # BLD AUTO: 0.37 THOUSAND/ÂΜL (ref 0–0.61)
EOSINOPHIL NFR BLD AUTO: 5 % (ref 0–6)
ERYTHROCYTE [DISTWIDTH] IN BLOOD BY AUTOMATED COUNT: 11.9 % (ref 11.6–15.1)
FLUAV RNA RESP QL NAA+PROBE: NEGATIVE
FLUBV RNA RESP QL NAA+PROBE: NEGATIVE
GFR SERPL CREATININE-BSD FRML MDRD: 74 ML/MIN/1.73SQ M
GLUCOSE SERPL-MCNC: 121 MG/DL (ref 65–140)
HCT VFR BLD AUTO: 44.2 % (ref 34.8–46.1)
HGB BLD-MCNC: 14.8 G/DL (ref 11.5–15.4)
IMM GRANULOCYTES # BLD AUTO: 0.02 THOUSAND/UL (ref 0–0.2)
IMM GRANULOCYTES NFR BLD AUTO: 0 % (ref 0–2)
INR PPP: 0.9 (ref 0.84–1.19)
LACTATE SERPL-SCNC: 1 MMOL/L (ref 0.5–2)
LYMPHOCYTES # BLD AUTO: 1.53 THOUSANDS/ÂΜL (ref 0.6–4.47)
LYMPHOCYTES NFR BLD AUTO: 21 % (ref 14–44)
MCH RBC QN AUTO: 30.8 PG (ref 26.8–34.3)
MCHC RBC AUTO-ENTMCNC: 33.5 G/DL (ref 31.4–37.4)
MCV RBC AUTO: 92 FL (ref 82–98)
MONOCYTES # BLD AUTO: 0.63 THOUSAND/ÂΜL (ref 0.17–1.22)
MONOCYTES NFR BLD AUTO: 9 % (ref 4–12)
NEUTROPHILS # BLD AUTO: 4.53 THOUSANDS/ÂΜL (ref 1.85–7.62)
NEUTS SEG NFR BLD AUTO: 64 % (ref 43–75)
NRBC BLD AUTO-RTO: 0 /100 WBCS
P AXIS: -14 DEGREES
PLATELET # BLD AUTO: 193 THOUSANDS/UL (ref 149–390)
PMV BLD AUTO: 9.5 FL (ref 8.9–12.7)
POTASSIUM SERPL-SCNC: 4.2 MMOL/L (ref 3.5–5.3)
PR INTERVAL: 136 MS
PROCALCITONIN SERPL-MCNC: 0.05 NG/ML
PROTHROMBIN TIME: 12.7 SECONDS (ref 11.6–14.5)
QRS AXIS: -26 DEGREES
QRSD INTERVAL: 70 MS
QT INTERVAL: 354 MS
QTC INTERVAL: 451 MS
RBC # BLD AUTO: 4.81 MILLION/UL (ref 3.81–5.12)
RSV RNA RESP QL NAA+PROBE: NEGATIVE
SARS-COV-2 RNA RESP QL NAA+PROBE: NEGATIVE
SODIUM SERPL-SCNC: 140 MMOL/L (ref 135–147)
T WAVE AXIS: -34 DEGREES
VENTRICULAR RATE: 98 BPM
WBC # BLD AUTO: 7.16 THOUSAND/UL (ref 4.31–10.16)

## 2024-03-09 PROCEDURE — 83880 ASSAY OF NATRIURETIC PEPTIDE: CPT | Performed by: EMERGENCY MEDICINE

## 2024-03-09 PROCEDURE — 85730 THROMBOPLASTIN TIME PARTIAL: CPT | Performed by: EMERGENCY MEDICINE

## 2024-03-09 PROCEDURE — 94640 AIRWAY INHALATION TREATMENT: CPT

## 2024-03-09 PROCEDURE — 99285 EMERGENCY DEPT VISIT HI MDM: CPT | Performed by: EMERGENCY MEDICINE

## 2024-03-09 PROCEDURE — 71045 X-RAY EXAM CHEST 1 VIEW: CPT

## 2024-03-09 PROCEDURE — 80048 BASIC METABOLIC PNL TOTAL CA: CPT | Performed by: EMERGENCY MEDICINE

## 2024-03-09 PROCEDURE — 96374 THER/PROPH/DIAG INJ IV PUSH: CPT

## 2024-03-09 PROCEDURE — 84484 ASSAY OF TROPONIN QUANT: CPT | Performed by: EMERGENCY MEDICINE

## 2024-03-09 PROCEDURE — 87040 BLOOD CULTURE FOR BACTERIA: CPT | Performed by: EMERGENCY MEDICINE

## 2024-03-09 PROCEDURE — 36415 COLL VENOUS BLD VENIPUNCTURE: CPT | Performed by: EMERGENCY MEDICINE

## 2024-03-09 PROCEDURE — 0241U HB NFCT DS VIR RESP RNA 4 TRGT: CPT | Performed by: EMERGENCY MEDICINE

## 2024-03-09 PROCEDURE — 85610 PROTHROMBIN TIME: CPT | Performed by: EMERGENCY MEDICINE

## 2024-03-09 PROCEDURE — 93005 ELECTROCARDIOGRAM TRACING: CPT

## 2024-03-09 PROCEDURE — 84145 PROCALCITONIN (PCT): CPT | Performed by: EMERGENCY MEDICINE

## 2024-03-09 PROCEDURE — 99285 EMERGENCY DEPT VISIT HI MDM: CPT

## 2024-03-09 PROCEDURE — 85025 COMPLETE CBC W/AUTO DIFF WBC: CPT | Performed by: EMERGENCY MEDICINE

## 2024-03-09 PROCEDURE — 93010 ELECTROCARDIOGRAM REPORT: CPT | Performed by: INTERNAL MEDICINE

## 2024-03-09 PROCEDURE — 83605 ASSAY OF LACTIC ACID: CPT | Performed by: EMERGENCY MEDICINE

## 2024-03-09 RX ORDER — PREDNISONE 20 MG/1
60 TABLET ORAL DAILY
Qty: 15 TABLET | Refills: 0 | Status: SHIPPED | OUTPATIENT
Start: 2024-03-09 | End: 2024-03-14

## 2024-03-09 RX ORDER — IPRATROPIUM BROMIDE AND ALBUTEROL SULFATE 2.5; .5 MG/3ML; MG/3ML
3 SOLUTION RESPIRATORY (INHALATION) ONCE
Status: COMPLETED | OUTPATIENT
Start: 2024-03-09 | End: 2024-03-09

## 2024-03-09 RX ORDER — METHYLPREDNISOLONE SODIUM SUCCINATE 125 MG/2ML
125 INJECTION, POWDER, LYOPHILIZED, FOR SOLUTION INTRAMUSCULAR; INTRAVENOUS ONCE
Status: COMPLETED | OUTPATIENT
Start: 2024-03-09 | End: 2024-03-09

## 2024-03-09 RX ADMIN — IPRATROPIUM BROMIDE AND ALBUTEROL SULFATE 3 ML: 2.5; .5 SOLUTION RESPIRATORY (INHALATION) at 08:55

## 2024-03-09 RX ADMIN — ALBUTEROL SULFATE 5 MG: 2.5 SOLUTION RESPIRATORY (INHALATION) at 08:55

## 2024-03-09 RX ADMIN — METHYLPREDNISOLONE SODIUM SUCCINATE 125 MG: 125 INJECTION, POWDER, FOR SOLUTION INTRAMUSCULAR; INTRAVENOUS at 09:26

## 2024-03-09 NOTE — ED PROVIDER NOTES
History  Chief Complaint   Patient presents with    Asthma     Patient co asthma exacerbation that started a few days ago. Took at home medication with minimal relief.      HPI patient is a 73-year-old female she reports a history of asthma.  She reports that she is taking her home nebulizer without relief.  She reports cough and congestion.  Denies any fever or chills.  Denies any chest pain.  Patient reports that she normally gets like this especially this time of year and requires sometimes steroids.  Patient reports usually feels better after IV steroids.  She denies any acute onset primarily reports increasing cough over the last few days.  Apparently her  is hospitalized apparently had a lung transplant and is currently having some other issues and is currently hospitalized at [facility].  Past medical history of asthma hypertension  Family history noncontributory  Social history, former smoker no history of drug abuse    Prior to Admission Medications   Prescriptions Last Dose Informant Patient Reported? Taking?   albuterol (ACCUNEB) 1.25 MG/3ML nebulizer solution   No No   Sig: Take 3 mL (1.25 mg total) by nebulization every 6 (six) hours as needed for wheezing or shortness of breath      Facility-Administered Medications: None       Past Medical History:   Diagnosis Date    Asthma     Hypertension        Past Surgical History:   Procedure Laterality Date    APPENDECTOMY         History reviewed. No pertinent family history.  I have reviewed and agree with the history as documented.    E-Cigarette/Vaping     E-Cigarette/Vaping Substances     Social History     Tobacco Use    Smoking status: Former    Smokeless tobacco: Never   Substance Use Topics    Alcohol use: Never    Drug use: No       Review of Systems   Constitutional:  Negative for fever.   HENT:  Negative for congestion.    Eyes:  Negative for pain and redness.   Respiratory:  Positive for cough and wheezing. Negative for shortness of breath.     Cardiovascular:  Negative for chest pain.   Gastrointestinal:  Negative for abdominal pain and vomiting.       Physical Exam  Physical Exam  Vitals and nursing note reviewed.   Constitutional:       Appearance: She is well-developed.   HENT:      Head: Normocephalic.      Right Ear: External ear normal.      Left Ear: External ear normal.      Nose: Nose normal.      Mouth/Throat:      Mouth: Mucous membranes are moist.      Pharynx: Oropharynx is clear.   Eyes:      General: Lids are normal.      Extraocular Movements: Extraocular movements intact.      Pupils: Pupils are equal, round, and reactive to light.   Pulmonary:      Effort: No respiratory distress.      Breath sounds: Wheezing present.      Comments: No air hunger no accessory muscle use, no retractions  Musculoskeletal:         General: No deformity. Normal range of motion.      Cervical back: Normal range of motion and neck supple.   Skin:     General: Skin is warm and dry.   Neurological:      Mental Status: She is alert and oriented to person, place, and time.   Psychiatric:         Mood and Affect: Mood normal.         Vital Signs  ED Triage Vitals [03/09/24 0846]   Temperature Pulse Respirations Blood Pressure SpO2   98.5 °F (36.9 °C) 96 20 (!) 184/79 97 %      Temp Source Heart Rate Source Patient Position - Orthostatic VS BP Location FiO2 (%)   Oral Monitor Sitting Right arm --      Pain Score       --           Vitals:    03/09/24 0846   BP: (!) 184/79   Pulse: 96   Patient Position - Orthostatic VS: Sitting         Visual Acuity  Visual Acuity      Flowsheet Row Most Recent Value   L Pupil Size (mm) 3   R Pupil Size (mm) 3            ED Medications  Medications   albuterol inhalation solution 5 mg (5 mg Nebulization Given 3/9/24 0855)   ipratropium-albuterol (DUO-NEB) 0.5-2.5 mg/3 mL inhalation solution 3 mL (3 mL Nebulization Given 3/9/24 0855)   methylPREDNISolone sodium succinate (Solu-MEDROL) injection 125 mg (125 mg Intravenous Given  3/9/24 0926)       Diagnostic Studies  Results Reviewed       Procedure Component Value Units Date/Time    Blood culture #1 [[id number]] Collected: 03/09/24 0922    Lab Status: Preliminary result Specimen: Blood from Arm, Right Updated: 03/10/24 1301     Blood Culture No Growth at 24 hrs.    Blood culture #2 [[id number]] Collected: 03/09/24 0925    Lab Status: Preliminary result Specimen: Blood from Arm, Left Updated: 03/10/24 1301     Blood Culture No Growth at 24 hrs.    Procalcitonin [[id number]]  (Normal) Collected: 03/09/24 0922    Lab Status: Final result Specimen: Blood from Arm, Right Updated: 03/09/24 1015     Procalcitonin 0.05 ng/ml     FLU/RSV/COVID - if FLU/RSV clinically relevant [[id number]]  (Normal) Collected: 03/09/24 0922    Lab Status: Final result Specimen: Nares from Nose Updated: 03/09/24 1012     SARS-CoV-2 Negative     INFLUENZA A PCR Negative     INFLUENZA B PCR Negative     RSV PCR Negative    Narrative:      FOR PEDIATRIC PATIENTS - copy/paste COVID Guidelines URL to browser: [email or web address]    SARS-CoV-2 assay is a Nucleic Acid Amplification assay intended for the  qualitative detection of nucleic acid from SARS-CoV-2 in nasopharyngeal  swabs. Results are for the presumptive identification of SARS-CoV-2 RNA.    Positive results are indicative of infection with SARS-CoV-2, the virus  causing COVID-19, but do not rule out bacterial infection or co-infection  with other viruses. Laboratories within the [place] and its  territories are required to report all positive results to the appropriate  public health authorities. Negative results do not preclude SARS-CoV-2  infection and should not be used as the sole basis for treatment or other  patient management decisions. Negative results must be combined with  clinical observations, patient history, and epidemiological information.  This test has not been FDA cleared or  approved.    This test has been authorized by FDA under an Emergency Use Authorization  (EUA). This test is only authorized for the duration of time the  declaration that circumstances exist justifying the authorization of the  emergency use of an in vitro diagnostic tests for detection of SARS-CoV-2  virus and/or diagnosis of COVID-19 infection under section 564(b)(1) of  the Act, 21 U.S.C. 360bbb-3(b)(1), unless the authorization is terminated  or revoked sooner. The test has been validated but independent review by FDA  and CLIA is pending.    Test performed using [employer] GeneXpert: This RT-PCR assay targets N2,  a region unique to SARS-CoV-2. A conserved region in the E-gene was chosen  for pan-Sarbecovirus detection which includes SARS-CoV-2.    According to CMS-2020-01-R, this platform meets the definition of high-throughput technology.    B-Type Natriuretic Peptide(BNP) [[id number]]  (Normal) Collected: 03/09/24 0922    Lab Status: Final result Specimen: Blood from Arm, Right Updated: 03/09/24 1008     BNP 33 pg/mL     Basic metabolic panel [[id number]] Collected: 03/09/24 0922    Lab Status: Final result Specimen: Blood from Arm, Right Updated: 03/09/24 1007     Sodium 140 mmol/L      Potassium 4.2 mmol/L      Chloride 105 mmol/L      CO2 26 mmol/L      ANION GAP 9 mmol/L      BUN 9 mg/dL      Creatinine 0.79 mg/dL      Glucose 121 mg/dL      Calcium 9.4 mg/dL      eGFR 74 ml/min/1.73sq m     Narrative:      National Kidney Disease Foundation guidelines for Chronic Kidney Disease (CKD):     Stage 1 with normal or high GFR (GFR > 90 mL/min/1.73 square meters)    Stage 2 Mild CKD (GFR = 60-89 mL/min/1.73 square meters)    Stage 3A Moderate CKD (GFR = 45-59 mL/min/1.73 square meters)    Stage 3B Moderate CKD (GFR = 30-44 mL/min/1.73 square meters)    Stage 4 Severe CKD (GFR = 15-29 mL/min/1.73 square meters)    Stage 5 End Stage CKD (GFR <15 mL/min/1.73 square meters)  Note: GFR calculation is accurate only with  a steady state creatinine    Lactic acid [[id number]]  (Normal) Collected: 03/09/24 0922    Lab Status: Final result Specimen: Blood from Arm, Right Updated: 03/09/24 1005     LACTIC ACID 1.0 mmol/L     Narrative:      Result may be elevated if tourniquet was used during collection.    HS Troponin 0hr (reflex protocol) [[id number]]  (Normal) Collected: 03/09/24 0922    Lab Status: Final result Specimen: Blood from Arm, Right Updated: 03/09/24 1002     hs TnI 0hr 3 ng/L     Protime-INR [[id number]]  (Normal) Collected: 03/09/24 0922    Lab Status: Final result Specimen: Blood from Arm, Right Updated: 03/09/24 0950     Protime 12.7 seconds      INR 0.90    APTT [[id number]]  (Normal) Collected: 03/09/24 0922    Lab Status: Final result Specimen: Blood from Arm, Right Updated: 03/09/24 0950     PTT 30 seconds     CBC and differential [[id number]] Collected: 03/09/24 0922    Lab Status: Final result Specimen: Blood from Arm, Right Updated: 03/09/24 0935     WBC 7.16 Thousand/uL      RBC 4.81 Million/uL      Hemoglobin 14.8 g/dL      Hematocrit 44.2 %      MCV 92 fL      MCH 30.8 pg      MCHC 33.5 g/dL      RDW 11.9 %      MPV 9.5 fL      Platelets 193 Thousands/uL      nRBC 0 /100 WBCs      Neutrophils Relative 64 %      Immat GRANS % 0 %      Lymphocytes Relative 21 %      Monocytes Relative 9 %      Eosinophils Relative 5 %      Basophils Relative 1 %      Neutrophils Absolute 4.53 Thousands/µL      Immature Grans Absolute 0.02 Thousand/uL      Lymphocytes Absolute 1.53 Thousands/µL      Monocytes Absolute 0.63 Thousand/µL      Eosinophils Absolute 0.37 Thousand/µL      Basophils Absolute 0.08 Thousands/µL                    X-ray chest 1 view portable   Final Result by Betty Goins MD (03/09 0904)      No acute cardiopulmonary disease.            Workstation performed: [id number]                    Procedures  Procedures         ED Course           Chest x-ray showed no definite infiltrates, reviewed by me, I also  reviewed the radiology report    Identification of Seniors at Risk      Flowsheet Row Most Recent Value   (ISAR) Identification of Seniors at Risk    Before the illness or injury that brought you to the Emergency, did you need someone to help you on a regular basis? 0 Filed at: 03/09/2024 0847   In the last 24 hours, have you needed more help than usual? 0 Filed at: 03/09/2024 0847   Have you been hospitalized for one or more nights during the past 6 months? 1 Filed at: 03/09/2024 0847   In general, do you see well? 0 Filed at: 03/09/2024 0847   In general, do you have serious problems with your memory? 0 Filed at: 03/09/2024 0847   Do you take more than three different medications every day? 1 Filed at: 03/09/2024 0847   ISAR Score 2 Filed at: 03/09/2024 0847          Diagnostic testing included a COVID influenza RSV testing which was negative, procalcitonin was negative  Electrolytes were within normal limits normal BUN/creatinine sign of renal dysfunction  Lactate was 1.0 no sign of severe sepsis.  Car troponin was performed because patient complained of shortness of breath her age I was concerned about cardiac ischemia or cardiac strain it was negative troponin was 3.  No sign of cardiac ischemia.  White count was normal at 7.1 no sign of inflammation hemoglobin was 14 no sign of anemia.    Patient markedly improved after IV steroids, reevaluated her lungs Are much clear.  Patient wanted to go home she did not want to stay in the hospital.              SBIRT 20yo+      Flowsheet Row Most Recent Value   Initial Alcohol Screen: US AUDIT-C     1. How often do you have a drink containing alcohol? 0 Filed at: 03/09/2024 0848   2. How many drinks containing alcohol do you have on a typical day you are drinking?  0 Filed at: 03/09/2024 0848   3b. FEMALE Any Age, or MALE 65+: How often do you have 4 or more drinks on one occassion? 0 Filed at: 03/09/2024 0848   Audit-C Score 0 Filed at: 03/09/2024 0848   RAMON: How many  times in the past year have you...    Used an illegal drug or used a prescription medication for non-medical reasons? Never Filed at: 03/09/2024 0848                      Medical Decision Making  Medical decision making 73-year-old female history of asthma presents emergency department with cough and wheezing.  Patient reports using her nebulizer without relief.  Patient received IV steroids here with marked improvement.  Diagnostic testing showed no heart failure no cardiac ischemia.  No infiltrates no definite pneumonia.  Discussed antibiotics with the patient but she prefers not to use antibiotics unless needed and I agree.  Shared decision making.  We discussed outpatient treatment and follow-up we discussed indications to return.  We discussed home steroids    Amount and/or Complexity of Data Reviewed  Labs: ordered.  Radiology: ordered.    Risk  Prescription drug management.             Disposition  Final diagnoses:   Asthma exacerbation     Time reflects when diagnosis was documented in both MDM as applicable and the Disposition within this note       Time User Action Codes Description Comment    3/9/2024 10:29 AM Mik Valles Add [J45.901] Asthma exacerbation           ED Disposition       ED Disposition   Discharge    Condition   Stable    Date/Time   Sat Mar 9, 2024 1029    Comment   Veronika Portillo discharge to home/self care.                   Follow-up Information       Follow up With Specialties Details Why Contact Info    Trevor Walton III, MD Internal Medicine   [address] [place] [address]  [phone]              Discharge Medication List as of 3/9/2024 10:31 AM        START taking these medications    Details   predniSONE 20 mg tablet Take 3 tablets (60 mg total) by mouth daily for 5 days, Starting Sat 3/9/2024, Until Thu 3/14/2024, Normal           CONTINUE these medications which have NOT CHANGED    Details   albuterol (ACCUNEB) 1.25 MG/3ML nebulizer solution Take 3 mL  (1.25 mg total) by nebulization every 6 (six) hours as needed for wheezing or shortness of breath, Starting Wed 12/4/2019, Print             No discharge procedures on file.    PDMP Review       None            ED Provider  Electronically Signed by             Mik Valles MD  03/10/24 2759

## 2024-03-09 NOTE — DISCHARGE INSTRUCTIONS
Continue your nebulizer / inhaler as needed  Prednisone 60 mg daily for the next 5 days  Return increased cough congestion worsening symptoms or any problems

## 2024-03-14 LAB
BACTERIA BLD CULT: NORMAL
BACTERIA BLD CULT: NORMAL

## 2024-05-15 ENCOUNTER — OFFICE VISIT (OUTPATIENT)
Dept: URGENT CARE | Facility: CLINIC | Age: 74
End: 2024-05-15
Payer: MEDICARE

## 2024-05-15 VITALS
SYSTOLIC BLOOD PRESSURE: 144 MMHG | HEART RATE: 107 BPM | OXYGEN SATURATION: 95 % | TEMPERATURE: 97.2 F | DIASTOLIC BLOOD PRESSURE: 82 MMHG | RESPIRATION RATE: 28 BRPM

## 2024-05-15 DIAGNOSIS — J45.901 ASTHMA WITH ACUTE EXACERBATION, UNSPECIFIED ASTHMA SEVERITY, UNSPECIFIED WHETHER PERSISTENT: Primary | ICD-10-CM

## 2024-05-15 PROCEDURE — 94640 AIRWAY INHALATION TREATMENT: CPT | Performed by: PHYSICIAN ASSISTANT

## 2024-05-15 PROCEDURE — 96372 THER/PROPH/DIAG INJ SC/IM: CPT | Performed by: PHYSICIAN ASSISTANT

## 2024-05-15 PROCEDURE — G0463 HOSPITAL OUTPT CLINIC VISIT: HCPCS | Performed by: PHYSICIAN ASSISTANT

## 2024-05-15 PROCEDURE — 99215 OFFICE O/P EST HI 40 MIN: CPT | Performed by: PHYSICIAN ASSISTANT

## 2024-05-15 RX ORDER — PREDNISONE 20 MG/1
60 TABLET ORAL DAILY
Qty: 15 TABLET | Refills: 0 | Status: SHIPPED | OUTPATIENT
Start: 2024-05-15 | End: 2024-05-20

## 2024-05-15 RX ORDER — METHYLPREDNISOLONE SODIUM SUCCINATE 40 MG/ML
60 INJECTION, POWDER, LYOPHILIZED, FOR SOLUTION INTRAMUSCULAR; INTRAVENOUS ONCE
Status: COMPLETED | OUTPATIENT
Start: 2024-05-15 | End: 2024-05-15

## 2024-05-15 RX ORDER — METHYLPREDNISOLONE SODIUM SUCCINATE 40 MG/ML
60 INJECTION, POWDER, LYOPHILIZED, FOR SOLUTION INTRAMUSCULAR; INTRAVENOUS ONCE
Status: DISCONTINUED | OUTPATIENT
Start: 2024-05-15 | End: 2024-05-15

## 2024-05-15 RX ORDER — FLUTICASONE FUROATE, UMECLIDINIUM BROMIDE AND VILANTEROL TRIFENATATE 200; 62.5; 25 UG/1; UG/1; UG/1
POWDER RESPIRATORY (INHALATION)
COMMUNITY
Start: 2024-05-13

## 2024-05-15 RX ORDER — IPRATROPIUM BROMIDE AND ALBUTEROL SULFATE 2.5; .5 MG/3ML; MG/3ML
3 SOLUTION RESPIRATORY (INHALATION) ONCE
Status: COMPLETED | OUTPATIENT
Start: 2024-05-15 | End: 2024-05-15

## 2024-05-15 RX ADMIN — METHYLPREDNISOLONE SODIUM SUCCINATE 60 MG: 40 INJECTION, POWDER, LYOPHILIZED, FOR SOLUTION INTRAMUSCULAR; INTRAVENOUS at 14:32

## 2024-05-15 RX ADMIN — IPRATROPIUM BROMIDE AND ALBUTEROL SULFATE 3 ML: 2.5; .5 SOLUTION RESPIRATORY (INHALATION) at 14:28

## 2024-05-15 NOTE — PROGRESS NOTES
[facility]        NAME: Veronika Portillo is a 73 y.o. female  : 1950    MRN: [id number]  DATE: May 15, 2024  TIME: 2:51 PM    Assessment and Plan   Asthma with acute exacerbation, unspecified asthma severity, unspecified whether persistent [J45.901]  1. Asthma with acute exacerbation, unspecified asthma severity, unspecified whether persistent  ipratropium-albuterol (DUO-NEB) 0.5-2.5 mg/3 mL inhalation solution 3 mL    predniSONE 20 mg tablet    methylPREDNISolone sodium succinate (Solu-MEDROL) injection 60 mg    DISCONTINUED: methylPREDNISolone sodium succinate (Solu-MEDROL) injection 60 mg          DuoNeb was given today as well as injection of IM Solu-Medrol.  Patient reports symptomatically feeling much better.  Her right lung fields sound improved left lung fields still with some wheezing but her oxygen saturation remained at 95%.  She is no respiratory distress.  Respiratory rate had decreased.  Prescription for oral steroids provided.  Strict return precautions to the ER were given    The patient verbalized understanding of exam findings and treatment plan.   We engaged in the shared decision-making process and treatment options were   discussed at length with the patient.  All questions, concerns and  complaints were answered and addressed to the patient's satisfaction.    Patient Instructions   There are no Patient Instructions on file for this visit.    Follow up with PCP in 3-5 days.  Proceed to  ER if symptoms worsen.    If tests are performed, our office will contact you with results only if   changes need to made to the care plan discussed with you at the visit.   [facility]     Chief Complaint     Chief Complaint   Patient presents with   • Shortness of Breath     Pt c/o sob astma wheezing on and  off for 1 week and has been using nebulizers and had a tx today. Hard to move and out of breath         History of Present Illness        HPI  Patient presents complaining of wheezing on and off about past week.  She did several nebulizers earlier this morning.  Last 1 was early in the morning today, she has had 2 different ED visits in the past several months for asthma exacerbations today her oxygen levels at 95%.  She has not any respiratory distress.  She is just having wheezing and trouble breathing.  She attributes this to seasonal allergies she does have a pulmonologist as well as primary care doctor.  She is on daily inhaler.    Review of Systems   Review of Systems  All other related systems reviewed and are negative except as noted in HPI    Current Medications       Current Outpatient Medications:   •  albuterol (ACCUNEB) 1.25 MG/3ML nebulizer solution, Take 3 mL (1.25 mg total) by nebulization every 6 (six) hours as needed for wheezing or shortness of breath, Disp: 30 vial, Rfl: 0  •  predniSONE 20 mg tablet, Take 3 tablets (60 mg total) by mouth daily for 5 days, Disp: 15 tablet, Rfl: 0  •  Trelegy Ellipta 200-62.5-25 MCG/ACT AEPB inhaler, , Disp: , Rfl:   No current facility-administered medications for this visit.    Current Allergies     Allergies as of 05/15/2024 - Reviewed 05/15/2024   Allergen Reaction Noted   • Aspirin Anaphylaxis 06/02/2022   • Nsaids Facial Swelling 12/20/2017            The following portions of the patient's history were reviewed and updated as appropriate: allergies, current medications, past family history, past medical history, past social history, past surgical history and problem list.     Past Medical History:   Diagnosis Date   • Asthma    • Hypertension        Past Surgical History:   Procedure Laterality Date   • APPENDECTOMY         History reviewed. No pertinent family history.      Medications have been verified.        Objective   /82   Pulse (!) 107   Temp (!) 97.2 °F (36.2 °C) (Tympanic)   Resp (!) 28   SpO2 95%   No LMP recorded. Patient is postmenopausal.       Physical Exam  "    Physical Exam  Constitutional:       General: She is not in acute distress.     Appearance: She is well-developed. She is not diaphoretic.   HENT:      Head: Normocephalic and atraumatic.   Eyes:      General: No scleral icterus.     Conjunctiva/sclera: Conjunctivae normal.   Neck:      Trachea: No tracheal deviation.   Cardiovascular:      Rate and Rhythm: Normal rate and regular rhythm.      Heart sounds: Normal heart sounds. No murmur heard.  Pulmonary:      Effort: Pulmonary effort is normal. Tachypnea present. No accessory muscle usage or respiratory distress.      Breath sounds: No stridor. Examination of the left-middle field reveals wheezing. Examination of the left-lower field reveals wheezing. Wheezing present. No decreased breath sounds, rhonchi or rales.   Musculoskeletal:      Cervical back: Normal range of motion and neck supple.   Lymphadenopathy:      Cervical: No cervical adenopathy.   Skin:     General: Skin is warm and dry.      Findings: No erythema.   Neurological:      Mental Status: She is alert and oriented to person, place, and time.   Psychiatric:         Behavior: Behavior normal.         Ortho Exam        Procedures  No Procedures performed today        Note: Portions of this record may have been created with voice recognition software. Occasional wrong word or \"sound a like\" substitutions may have occurred due to the inherent limitations of voice recognition software. Please read the chart carefully and recognize, using context, where substitutions have occurred.*      "

## 2024-07-31 PROCEDURE — 99283 EMERGENCY DEPT VISIT LOW MDM: CPT

## 2024-07-31 PROCEDURE — 94640 AIRWAY INHALATION TREATMENT: CPT

## 2024-08-01 ENCOUNTER — HOSPITAL ENCOUNTER (EMERGENCY)
Facility: HOSPITAL | Age: 74
Discharge: HOME | End: 2024-08-01
Attending: EMERGENCY MEDICINE
Payer: MEDICARE

## 2024-08-01 VITALS
RESPIRATION RATE: 18 BRPM | HEART RATE: 82 BPM | BODY MASS INDEX: 25.11 KG/M2 | DIASTOLIC BLOOD PRESSURE: 79 MMHG | OXYGEN SATURATION: 98 % | SYSTOLIC BLOOD PRESSURE: 175 MMHG | TEMPERATURE: 98 F | HEIGHT: 67 IN | WEIGHT: 160 LBS

## 2024-08-01 DIAGNOSIS — J45.901 ACUTE ASTHMA EXACERBATION: Primary | ICD-10-CM

## 2024-08-01 PROCEDURE — 99284 EMERGENCY DEPT VISIT MOD MDM: CPT | Performed by: EMERGENCY MEDICINE

## 2024-08-01 PROCEDURE — 94760 N-INVAS EAR/PLS OXIMETRY 1: CPT

## 2024-08-01 PROCEDURE — 94640 AIRWAY INHALATION TREATMENT: CPT

## 2024-08-01 PROCEDURE — 96375 TX/PRO/DX INJ NEW DRUG ADDON: CPT

## 2024-08-01 PROCEDURE — 94664 DEMO&/EVAL PT USE INHALER: CPT

## 2024-08-01 PROCEDURE — 96365 THER/PROPH/DIAG IV INF INIT: CPT

## 2024-08-01 PROCEDURE — 94644 CONT INHLJ TX 1ST HOUR: CPT

## 2024-08-01 RX ORDER — SODIUM CHLORIDE FOR INHALATION 0.9 %
12 VIAL, NEBULIZER (ML) INHALATION ONCE
Status: COMPLETED | OUTPATIENT
Start: 2024-08-01 | End: 2024-08-01

## 2024-08-01 RX ORDER — ALBUTEROL SULFATE 2.5 MG/3ML
5 SOLUTION RESPIRATORY (INHALATION) ONCE
Status: COMPLETED | OUTPATIENT
Start: 2024-08-01 | End: 2024-08-01

## 2024-08-01 RX ORDER — PREDNISONE 20 MG/1
40 TABLET ORAL DAILY
Qty: 10 TABLET | Refills: 0 | Status: SHIPPED | OUTPATIENT
Start: 2024-08-01 | End: 2024-08-06

## 2024-08-01 RX ORDER — METHYLPREDNISOLONE SODIUM SUCCINATE 125 MG/2ML
125 INJECTION, POWDER, LYOPHILIZED, FOR SOLUTION INTRAMUSCULAR; INTRAVENOUS ONCE
Status: COMPLETED | OUTPATIENT
Start: 2024-08-01 | End: 2024-08-01

## 2024-08-01 RX ORDER — MAGNESIUM SULFATE HEPTAHYDRATE 40 MG/ML
2 INJECTION, SOLUTION INTRAVENOUS ONCE
Status: COMPLETED | OUTPATIENT
Start: 2024-08-01 | End: 2024-08-01

## 2024-08-01 RX ADMIN — ALBUTEROL SULFATE 10 MG: 2.5 SOLUTION RESPIRATORY (INHALATION) at 00:54

## 2024-08-01 RX ADMIN — IPRATROPIUM BROMIDE 0.5 MG: 0.5 SOLUTION RESPIRATORY (INHALATION) at 00:15

## 2024-08-01 RX ADMIN — ALBUTEROL SULFATE 5 MG: 2.5 SOLUTION RESPIRATORY (INHALATION) at 00:15

## 2024-08-01 RX ADMIN — IPRATROPIUM BROMIDE 1 MG: 0.5 SOLUTION RESPIRATORY (INHALATION) at 00:54

## 2024-08-01 RX ADMIN — MAGNESIUM SULFATE HEPTAHYDRATE 2 G: 40 INJECTION, SOLUTION INTRAVENOUS at 00:34

## 2024-08-01 RX ADMIN — ISODIUM CHLORIDE 12 ML: 0.03 SOLUTION RESPIRATORY (INHALATION) at 00:54

## 2024-08-01 RX ADMIN — METHYLPREDNISOLONE SODIUM SUCCINATE 125 MG: 125 INJECTION, POWDER, FOR SOLUTION INTRAMUSCULAR; INTRAVENOUS at 00:34

## 2024-08-01 NOTE — ED PROVIDER NOTES
History  Chief Complaint   Patient presents with    Asthma     C/o asthma exac since yesterday. Tried neb treatments at home with no relief.      73-year-old female with a past medical history of asthma presents for evaluation with shortness of breath.  Patient reports worsening asthma symptoms over the past few days.  She states that the other morning she woke up with severe difficulty breathing, but this improved with a nebulizer treatment.  Patient then felt somewhat improved for few days, and then had worsening symptoms again today.  She states she has been using home nebulizers with minimal relief today.  She reports associated chest tightness, wheezing, and a cough.  She denies any recent fevers or chills.  Patient is not currently on any oral steroids.  She has been intubated twice previously for asthma exacerbations.        Prior to Admission Medications   Prescriptions Last Dose Informant Patient Reported? Taking?   Trelegy Ellipta 200-62.5-25 MCG/ACT AEPB inhaler   Yes No   albuterol (ACCUNEB) 1.25 MG/3ML nebulizer solution   No No   Sig: Take 3 mL (1.25 mg total) by nebulization every 6 (six) hours as needed for wheezing or shortness of breath      Facility-Administered Medications: None       Past Medical History:   Diagnosis Date    Asthma     Hypertension        Past Surgical History:   Procedure Laterality Date    APPENDECTOMY         History reviewed. No pertinent family history.  I have reviewed and agree with the history as documented.    E-Cigarette/Vaping    E-Cigarette Use Never User      E-Cigarette/Vaping Substances     Social History     Tobacco Use    Smoking status: Former    Smokeless tobacco: Never   Vaping Use    Vaping status: Never Used   Substance Use Topics    Alcohol use: Never    Drug use: No       Review of Systems   Constitutional:  Negative for chills and fever.   Respiratory:  Positive for cough, shortness of breath and wheezing.    Cardiovascular:  Negative for chest pain.    Gastrointestinal:  Negative for abdominal pain, nausea and vomiting.   All other systems reviewed and are negative.      Physical Exam  Physical Exam  Vitals and nursing note reviewed.   Constitutional:       General: She is awake. She is not in acute distress.     Appearance: She is not toxic-appearing.   HENT:      Head: Normocephalic and atraumatic.   Eyes:      General: Vision grossly intact. Gaze aligned appropriately.   Cardiovascular:      Rate and Rhythm: Normal rate and regular rhythm.      Heart sounds: Normal heart sounds.   Pulmonary:      Effort: Pulmonary effort is normal. Tachypnea present. No respiratory distress.      Breath sounds: Wheezing (diffuse inspiratory and expiratory) present.   Musculoskeletal:      Cervical back: Full passive range of motion without pain and neck supple.   Skin:     General: Skin is warm and dry.   Neurological:      General: No focal deficit present.      Mental Status: She is alert and oriented to person, place, and time.         Vital Signs  ED Triage Vitals [08/01/24 0003]   Temperature Pulse Respirations Blood Pressure SpO2   98 °F (36.7 °C) 97 22 (!) 197/92 95 %      Temp Source Heart Rate Source Patient Position - Orthostatic VS BP Location FiO2 (%)   Temporal Monitor Sitting Left arm --      Pain Score       --           Vitals:    08/01/24 0003 08/01/24 0019 08/01/24 0045   BP: (!) 197/92  (!) 189/88   Pulse: 97 87 86   Patient Position - Orthostatic VS: Sitting           Visual Acuity      ED Medications  Medications   albuterol inhalation solution 5 mg (5 mg Nebulization Given 8/1/24 0015)     And   ipratropium (ATROVENT) 0.02 % inhalation solution 0.5 mg (0.5 mg Nebulization Given 8/1/24 0015)   magnesium sulfate 2 g/50 mL IVPB (premix) 2 g (0 g Intravenous Stopped 8/1/24 0114)   methylPREDNISolone sodium succinate (Solu-MEDROL) injection 125 mg (125 mg Intravenous Given 8/1/24 0034)   albuterol inhalation solution 10 mg (10 mg Nebulization Given 8/1/24  0054)   ipratropium (ATROVENT) 0.02 % inhalation solution 1 mg (1 mg Nebulization Given 8/1/24 0054)   sodium chloride 0.9 % inhalation solution 12 mL (12 mL Nebulization Given 8/1/24 0054)       Diagnostic Studies  Results Reviewed       None                   No orders to display              Procedures  Procedures         ED Course  ED Course as of 08/01/24 0132   u Aug 01, 2024   0112 On reevaluation after DuoNeb and magnesium, patient reports some improvement in symptoms.  Patient still noted to have diffuse expiratory wheezing throughout all lung fields.  Heart neb currently running, will reevaluate.                                 SBIRT 22yo+      Flowsheet Row Most Recent Value   Initial Alcohol Screen: US AUDIT-C     1. How often do you have a drink containing alcohol? 0 Filed at: 08/01/2024 0005   2. How many drinks containing alcohol do you have on a typical day you are drinking?  0 Filed at: 08/01/2024 0005   3b. FEMALE Any Age, or MALE 65+: How often do you have 4 or more drinks on one occassion? 0 Filed at: 08/01/2024 0005   Audit-C Score 0 Filed at: 08/01/2024 0005   RAMON: How many times in the past year have you...    Used an illegal drug or used a prescription medication for non-medical reasons? Never Filed at: 08/01/2024 0005                      Medical Decision Making  Risk  Prescription drug management.                 Disposition  Final diagnoses:   None     ED Disposition       None          Follow-up Information    None         Patient's Medications   Discharge Prescriptions    No medications on file       No discharge procedures on file.    PDMP Review       None            ED Provider  Electronically Signed by

## 2024-09-07 NOTE — ED PROVIDER NOTES
History  Chief Complaint   Patient presents with    Asthma     C/o asthma exac since yesterday. Tried neb treatments at home with no relief.      74 year old female with a history of HTN and asthma presents for evaluation with an asthma exacerbation. Patient reports worsening symptoms since yesterday. She is having chest tightness and wheezing. She tried using her home duoneb treatments without significant improvement in symptoms. Patient has been hospitalized previously for her asthma, and was intubated once before for her asthma. She denies any fevers, chills, abdominal pain, nausea, vomiting, or other concerning symptoms.         Prior to Admission Medications   Prescriptions Last Dose Informant Patient Reported? Taking?   Trelegy Ellipta 200-62.5-25 MCG/ACT AEPB inhaler   Yes No   albuterol (ACCUNEB) 1.25 MG/3ML nebulizer solution   No No   Sig: Take 3 mL (1.25 mg total) by nebulization every 6 (six) hours as needed for wheezing or shortness of breath      Facility-Administered Medications: None       Past Medical History:   Diagnosis Date    Asthma     Hypertension        Past Surgical History:   Procedure Laterality Date    APPENDECTOMY         History reviewed. No pertinent family history.  I have reviewed and agree with the history as documented.    E-Cigarette/Vaping    E-Cigarette Use Never User      E-Cigarette/Vaping Substances     Social History     Tobacco Use    Smoking status: Former    Smokeless tobacco: Never   Vaping Use    Vaping status: Never Used   Substance Use Topics    Alcohol use: Never    Drug use: No       Review of Systems   Constitutional:  Negative for chills and fever.   Respiratory:  Positive for chest tightness, shortness of breath and wheezing.    Cardiovascular:  Negative for chest pain.   Gastrointestinal:  Negative for abdominal pain, nausea and vomiting.   All other systems reviewed and are negative.      Physical Exam  Physical Exam  Vitals and nursing note reviewed.    Constitutional:       General: She is awake. She is not in acute distress.     Appearance: She is not toxic-appearing.   HENT:      Head: Normocephalic and atraumatic.   Eyes:      General: Vision grossly intact. Gaze aligned appropriately.   Cardiovascular:      Rate and Rhythm: Normal rate and regular rhythm.      Heart sounds: Normal heart sounds.   Pulmonary:      Effort: Pulmonary effort is normal. Prolonged expiration present. No respiratory distress.      Breath sounds: Wheezing (diffuse) present.   Musculoskeletal:      Cervical back: Full passive range of motion without pain and neck supple.   Skin:     General: Skin is warm and dry.   Neurological:      General: No focal deficit present.      Mental Status: She is alert and oriented to person, place, and time.         Vital Signs  ED Triage Vitals [08/01/24 0003]   Temperature Pulse Respirations Blood Pressure SpO2   98 °F (36.7 °C) 97 22 (!) 197/92 95 %      Temp Source Heart Rate Source Patient Position - Orthostatic VS BP Location FiO2 (%)   Temporal Monitor Sitting Left arm --      Pain Score       --           Vitals:    08/01/24 0045 08/01/24 0100 08/01/24 0130 08/01/24 0230   BP: (!) 189/88 (!) 204/86 (!) 205/89 (!) 175/79   Pulse: 86 82 76 82   Patient Position - Orthostatic VS:             Visual Acuity      ED Medications  Medications   albuterol inhalation solution 5 mg (5 mg Nebulization Given 8/1/24 0015)     And   ipratropium (ATROVENT) 0.02 % inhalation solution 0.5 mg (0.5 mg Nebulization Given 8/1/24 0015)   magnesium sulfate 2 g/50 mL IVPB (premix) 2 g (0 g Intravenous Stopped 8/1/24 0114)   methylPREDNISolone sodium succinate (Solu-MEDROL) injection 125 mg (125 mg Intravenous Given 8/1/24 0034)   albuterol inhalation solution 10 mg (10 mg Nebulization Given 8/1/24 0054)   ipratropium (ATROVENT) 0.02 % inhalation solution 1 mg (1 mg Nebulization Given 8/1/24 0054)   sodium chloride 0.9 % inhalation solution 12 mL (12 mL Nebulization  Given 8/1/24 0054)       Diagnostic Studies  Results Reviewed       None                   No orders to display              Procedures  Procedures         ED Course  ED Course as of 09/07/24 1058   Thu Aug 01, 2024   0112 On reevaluation after DuoNeb and magnesium, patient reports some improvement in symptoms.  Patient still noted to have diffuse expiratory wheezing throughout all lung fields.  Heart neb currently running, will reevaluate.                                 SBIRT 22yo+      Flowsheet Row Most Recent Value   Initial Alcohol Screen: US AUDIT-C     1. How often do you have a drink containing alcohol? 0 Filed at: 08/01/2024 0005   2. How many drinks containing alcohol do you have on a typical day you are drinking?  0 Filed at: 08/01/2024 0005   3b. FEMALE Any Age, or MALE 65+: How often do you have 4 or more drinks on one occassion? 0 Filed at: 08/01/2024 0005   Audit-C Score 0 Filed at: 08/01/2024 0005   RAMON: How many times in the past year have you...    Used an illegal drug or used a prescription medication for non-medical reasons? Never Filed at: 08/01/2024 0005                      Medical Decision Making  74 year old female presents with an acute asthma exacerbation.  On exam, patient hypertensive, otherwise with normal vitals.  Patient is noted to have prolonged expiratory phase and there is diffuse wheezing noted on lung exam.  Symptoms consistent with an acute asthma exacerbation.  Patient given DuoNeb and IV magnesium for treatment.    Patient did report some improvement in symptoms after DuoNeb and magnesium but patient was noted to have persistent end expiratory wheezes on lung exam.  Ortega neb was given for further treatment.  Patient also given IV Solu-Medrol.    After heart neb, patient reports significant improvement in symptoms.  She was still noted to have some wheezing on lung exam, but lung sounds improved overall.  Given the severity of the patient's symptoms, and the number of  treatments she is required, along with her history of requiring intubation previously, I did suggest admission for further monitoring and treatment.  The patient declined admission at this time.  She states that she feels well enough to go home.  Patient was given a 5-day course of steroids to take at home.  She was encouraged to follow-up with her PCP for further evaluation.  Patient was discharged home in stable condition with symptomatic care instructions and strict ED return precautions.    Risk  Prescription drug management.                 Disposition  Final diagnoses:   Acute asthma exacerbation     Time reflects when diagnosis was documented in both MDM as applicable and the Disposition within this note       Time User Action Codes Description Comment    8/1/2024  2:21 AM Lyn Carrero [J45.901] Acute asthma exacerbation           ED Disposition       ED Disposition   Discharge    Condition   Stable    Date/Time   Thu Aug 1, 2024 8078    Comment   Veronika Portillo discharge to home/self care.                   Follow-up Information    None         Discharge Medication List as of 8/1/2024  2:38 AM        START taking these medications    Details   predniSONE 20 mg tablet Take 2 tablets (40 mg total) by mouth daily for 5 days, Starting Thu 8/1/2024, Until Tue 8/6/2024, Normal           CONTINUE these medications which have NOT CHANGED    Details   albuterol (ACCUNEB) 1.25 MG/3ML nebulizer solution Take 3 mL (1.25 mg total) by nebulization every 6 (six) hours as needed for wheezing or shortness of breath, Starting Wed 12/4/2019, Print      Trelegy Ellipta 200-62.5-25 MCG/ACT AEPB inhaler Historical Med             No discharge procedures on file.    PDMP Review       None            ED Provider  Electronically Signed by             Lyn Carrero DO  09/07/24 0114

## 2024-09-13 ENCOUNTER — HOSPITAL ENCOUNTER (EMERGENCY)
Facility: HOSPITAL | Age: 74
Discharge: HOME | End: 2024-09-13
Attending: EMERGENCY MEDICINE
Payer: MEDICARE

## 2024-09-13 VITALS
BODY MASS INDEX: 24.75 KG/M2 | SYSTOLIC BLOOD PRESSURE: 140 MMHG | OXYGEN SATURATION: 94 % | TEMPERATURE: 98.2 F | DIASTOLIC BLOOD PRESSURE: 63 MMHG | HEART RATE: 74 BPM | WEIGHT: 158 LBS | RESPIRATION RATE: 18 BRPM

## 2024-09-13 DIAGNOSIS — J45.41 MODERATE PERSISTENT ASTHMA WITH ACUTE EXACERBATION: Primary | ICD-10-CM

## 2024-09-13 PROCEDURE — 99283 EMERGENCY DEPT VISIT LOW MDM: CPT

## 2024-09-13 PROCEDURE — 96365 THER/PROPH/DIAG IV INF INIT: CPT

## 2024-09-13 PROCEDURE — 94640 AIRWAY INHALATION TREATMENT: CPT

## 2024-09-13 PROCEDURE — 99284 EMERGENCY DEPT VISIT MOD MDM: CPT | Performed by: EMERGENCY MEDICINE

## 2024-09-13 PROCEDURE — 96375 TX/PRO/DX INJ NEW DRUG ADDON: CPT

## 2024-09-13 RX ORDER — METHYLPREDNISOLONE SODIUM SUCCINATE 125 MG/2ML
125 INJECTION, POWDER, LYOPHILIZED, FOR SOLUTION INTRAMUSCULAR; INTRAVENOUS ONCE
Status: COMPLETED | OUTPATIENT
Start: 2024-09-13 | End: 2024-09-13

## 2024-09-13 RX ORDER — IPRATROPIUM BROMIDE AND ALBUTEROL SULFATE 2.5; .5 MG/3ML; MG/3ML
3 SOLUTION RESPIRATORY (INHALATION) ONCE
Status: COMPLETED | OUTPATIENT
Start: 2024-09-13 | End: 2024-09-13

## 2024-09-13 RX ORDER — MAGNESIUM SULFATE HEPTAHYDRATE 40 MG/ML
2 INJECTION, SOLUTION INTRAVENOUS ONCE
Status: COMPLETED | OUTPATIENT
Start: 2024-09-13 | End: 2024-09-13

## 2024-09-13 RX ORDER — PREDNISONE 20 MG/1
60 TABLET ORAL DAILY
Qty: 21 TABLET | Refills: 0 | Status: SHIPPED | OUTPATIENT
Start: 2024-09-13 | End: 2024-09-20

## 2024-09-13 RX ADMIN — IPRATROPIUM BROMIDE AND ALBUTEROL SULFATE 3 ML: 2.5; .5 SOLUTION RESPIRATORY (INHALATION) at 17:30

## 2024-09-13 RX ADMIN — METHYLPREDNISOLONE SODIUM SUCCINATE 125 MG: 125 INJECTION, POWDER, FOR SOLUTION INTRAMUSCULAR; INTRAVENOUS at 17:29

## 2024-09-13 RX ADMIN — MAGNESIUM SULFATE HEPTAHYDRATE 2 G: 40 INJECTION, SOLUTION INTRAVENOUS at 17:29

## 2024-09-14 NOTE — ED PROVIDER NOTES
1. Moderate persistent asthma with acute exacerbation      ED Disposition       ED Disposition   Discharge    Condition   Stable    Date/Time   Fri Sep 13, 2024  6:54 PM    Comment   Veronika Portillo discharge to home/self care.                   Assessment & Plan       Medical Decision Making  74-year-old female with history of asthma who gets asthma exacerbations is coming in with complaint of wheezing not being relieved by her nebulizers.  Gets multiple flares a year.  Often around weather change.  Sometimes when she is ill.  She has not recently been sick and does not have cough or fever.  Feels with the weather change she is getting an asthma flare and has been using her nebulizers which give her temporary relief but she is needing more in them more frequently.  This is usually when she needs to come in for magnesium and steroids.  Patient on exam is not in distress but has intermittent dry cough and on exam has inspiratory and expiratory wheeze with some mild tachypnea.  Will give patient multiple nebs.  No fever and no otherwise cough so does not feel she needs other workup.  Mostly feels like she needs steroids and mag and burst for home.  Will give her dose of IV steroids and magnesium and reassess.    Risk  Prescription drug management.  Risk Details: Patient felt significantly improved.  Discussed with her for steroids.  Does not feel like she needs another neb we will continue intermittent nebulizers at home.                       Medications   methylPREDNISolone sodium succinate (Solu-MEDROL) injection 125 mg (125 mg Intravenous Given 9/13/24 1729)   magnesium sulfate 2 g/50 mL IVPB (premix) 2 g (0 g Intravenous Stopped 9/13/24 1858)   ipratropium-albuterol (DUO-NEB) 0.5-2.5 mg/3 mL inhalation solution 3 mL (3 mL Nebulization Given 9/13/24 1730)   ipratropium-albuterol (DUO-NEB) 0.5-2.5 mg/3 mL inhalation solution 3 mL (3 mL Nebulization Given 9/13/24 1730)       History of Present Illness          History provided by:  Patient  Asthma  Location:  Asthma and wheezing for the past few days  Quality:  Feels tightness, used her nebs at home without relief  Severity:  Moderate  Onset quality:  Gradual  Duration:  3 days  Timing:  Constant  Progression:  Worsening  Chronicity:  Recurrent  Context:  Patient has history of significant asthma.  Gets flares every few months.  She does take her nebs and albuterol inhalers and takes a chronic controller inhaler but when she is sick or weather changes she often needs burst of steroids.  Relieved by:  She has taken multiple nebs including last just prior to arrival gets temporary relief but still feels tightness.  Worsened by:  Weather change.  Ineffective treatments:  Has been taking her nebs without long lasting relief and has been noting she is needed more and more frequently so realize she needed to come in.  No fever or productive cough  Associated symptoms: shortness of breath and wheezing    Associated symptoms: no chest pain, no congestion, no cough, no fatigue, no fever and no sore throat            Review of Systems   Constitutional:  Negative for fatigue and fever.   HENT:  Negative for congestion and sore throat.    Respiratory:  Positive for shortness of breath and wheezing. Negative for cough.    Cardiovascular:  Negative for chest pain.   All other systems reviewed and are negative.          Objective     ED Triage Vitals [09/13/24 1642]   Temperature Pulse Blood Pressure Respirations SpO2 Patient Position - Orthostatic VS   98.2 °F (36.8 °C) 88 150/70 20 93 % Sitting      Temp Source Heart Rate Source BP Location FiO2 (%) Pain Score    Oral Monitor Left arm -- --        Physical Exam  Vitals and nursing note reviewed.   Constitutional:       General: She is not in acute distress.     Appearance: She is well-developed. She is not ill-appearing, toxic-appearing or diaphoretic.   HENT:      Head: Normocephalic and atraumatic.      Nose: Nose normal.    Eyes:      Extraocular Movements: Extraocular movements intact.      Conjunctiva/sclera: Conjunctivae normal.   Cardiovascular:      Rate and Rhythm: Normal rate and regular rhythm.      Heart sounds: Normal heart sounds.   Pulmonary:      Effort: Pulmonary effort is normal. No respiratory distress.      Breath sounds: Wheezing present. Rales: diffuse insp/exp wheeze.  Abdominal:      General: There is no distension.      Palpations: Abdomen is soft.      Tenderness: There is no abdominal tenderness.   Musculoskeletal:         General: No deformity. Normal range of motion.      Cervical back: Neck supple.   Skin:     General: Skin is warm and dry.   Neurological:      General: No focal deficit present.      Mental Status: She is alert and oriented to person, place, and time.      Cranial Nerves: No cranial nerve deficit.   Psychiatric:         Mood and Affect: Mood normal.         Labs Reviewed - No data to display  No orders to display       Procedures       Tami Canchola MD  09/14/24 0022

## 2024-11-01 ENCOUNTER — OFFICE VISIT (OUTPATIENT)
Dept: URGENT CARE | Facility: CLINIC | Age: 74
End: 2024-11-01
Payer: MEDICARE

## 2024-11-01 ENCOUNTER — APPOINTMENT (OUTPATIENT)
Dept: RADIOLOGY | Facility: CLINIC | Age: 74
End: 2024-11-01
Payer: MEDICARE

## 2024-11-01 VITALS
WEIGHT: 159 LBS | TEMPERATURE: 97.4 F | DIASTOLIC BLOOD PRESSURE: 82 MMHG | RESPIRATION RATE: 20 BRPM | BODY MASS INDEX: 24.9 KG/M2 | OXYGEN SATURATION: 95 % | HEART RATE: 78 BPM | SYSTOLIC BLOOD PRESSURE: 116 MMHG

## 2024-11-01 DIAGNOSIS — J45.901 ASTHMA WITH ACUTE EXACERBATION, UNSPECIFIED ASTHMA SEVERITY, UNSPECIFIED WHETHER PERSISTENT: ICD-10-CM

## 2024-11-01 DIAGNOSIS — J45.901 ASTHMA WITH ACUTE EXACERBATION, UNSPECIFIED ASTHMA SEVERITY, UNSPECIFIED WHETHER PERSISTENT: Primary | ICD-10-CM

## 2024-11-01 PROCEDURE — 94640 AIRWAY INHALATION TREATMENT: CPT | Performed by: PHYSICIAN ASSISTANT

## 2024-11-01 PROCEDURE — 99214 OFFICE O/P EST MOD 30 MIN: CPT | Performed by: PHYSICIAN ASSISTANT

## 2024-11-01 PROCEDURE — G0463 HOSPITAL OUTPT CLINIC VISIT: HCPCS | Performed by: PHYSICIAN ASSISTANT

## 2024-11-01 PROCEDURE — 71046 X-RAY EXAM CHEST 2 VIEWS: CPT

## 2024-11-01 RX ORDER — METHYLPREDNISOLONE SODIUM SUCCINATE 125 MG/2ML
125 INJECTION, POWDER, LYOPHILIZED, FOR SOLUTION INTRAMUSCULAR; INTRAVENOUS ONCE
Status: COMPLETED | OUTPATIENT
Start: 2024-11-01 | End: 2024-11-01

## 2024-11-01 RX ORDER — PREDNISONE 20 MG/1
20 TABLET ORAL 3 TIMES DAILY
Qty: 15 TABLET | Refills: 0 | Status: SHIPPED | OUTPATIENT
Start: 2024-11-01 | End: 2024-11-06

## 2024-11-01 RX ORDER — ALBUTEROL SULFATE 0.83 MG/ML
2.5 SOLUTION RESPIRATORY (INHALATION) ONCE
Status: COMPLETED | OUTPATIENT
Start: 2024-11-01 | End: 2024-11-01

## 2024-11-01 RX ADMIN — METHYLPREDNISOLONE SODIUM SUCCINATE 125 MG: 125 INJECTION, POWDER, LYOPHILIZED, FOR SOLUTION INTRAMUSCULAR; INTRAVENOUS at 13:29

## 2024-11-01 RX ADMIN — ALBUTEROL SULFATE 2.5 MG: 0.83 SOLUTION RESPIRATORY (INHALATION) at 13:29

## 2024-11-01 NOTE — PROGRESS NOTES
[facility]        NAME: Veronika Portillo is a 74 y.o. female  : 1950    MRN: [id number]  DATE: 2024  TIME: 2:05 PM    Assessment and Plan   Asthma with acute exacerbation, unspecified asthma severity, unspecified whether persistent [J45.901]  1. Asthma with acute exacerbation, unspecified asthma severity, unspecified whether persistent  albuterol inhalation solution 2.5 mg    methylPREDNISolone sodium succinate (Solu-MEDROL) injection 125 mg    XR chest pa and lateral    predniSONE 20 mg tablet            Patient Instructions   After Solu-Medrol and nebulizer patient sounds slightly improved however she is still wheezing and tight.  Her saturations are now ranging between 93-95.  Patient is refusing to go to the ED.  KUNAL signed.  I did recommend that she call the pulmonologist and inform him what was going on.  Chest x-ray unremarkable.  Prednisone called in to be started tomorrow.  If tests have been performed at [facility], our office will contact you with results if changes need to be made to the care plan discussed with you at the visit.  [facility]  Follow up with PCP in 3-5 days.  Proceed to  ER if symptoms worsen.  Patient notes that she feels improved since 1 unit here at her receiving medication.  She also did call her an allergy office in the clinic and is awaiting a callback from them.    Chief Complaint     Chief Complaint   Patient presents with    Asthma     Pt c/o asthma flare up and has been getting worse especially last night and had to use oxygen at home. At room air she was at 84 yesterday chest hurt and had a hard time moving air and was using nebulizer every hour          History of Present Illness       Asthma  She complains of cough, shortness of breath and wheezing. Pertinent negatives include no chest pain, ear pain, fever or sore throat. Her past medical history is significant for asthma.     This is a 74-year-old-year-old  female here complaining of an asthma exacerbation.  She notes has been acting up for the last week.  She has not contacted her pulmonologist.  Patient notes yesterday her pulse ox went as low as 84 at home and she had to use her 's oxygen.  She notes yesterday she was having chest pain however notes that has resolved today.  She notes cough and slight chest tightness.  She notes she actually feels improved currently.  She notes her last nebulizer was approximately 1 hour ago around noon.  She notes yesterday she was using albuterol nebs 4-5 times per day but doubling the dose.  She notes today she has been using a single albuterol nebulizer about every hour.  She also has her albuterol inhaler.  She takes Trelegy for her asthma.  She denies currently shortness of breath, chest pain, vomiting, diarrhea, sore throat, nasal congestion, sore throat or any sick symptoms.  Patient notes that she normally gets IM prednisone action, prednisone 60 for 5 days and this relieves her symptoms.  Review of Systems   Review of Systems   Constitutional:  Negative for fever.   HENT:  Negative for congestion, ear pain and sore throat.    Respiratory:  Positive for cough, chest tightness, shortness of breath and wheezing.    Cardiovascular:  Negative for chest pain.   Gastrointestinal:  Negative for diarrhea and vomiting.         Current Medications       Current Outpatient Medications:     albuterol (ACCUNEB) 1.25 MG/3ML nebulizer solution, Take 3 mL (1.25 mg total) by nebulization every 6 (six) hours as needed for wheezing or shortness of breath, Disp: 30 vial, Rfl: 0    predniSONE 20 mg tablet, Take 1 tablet (20 mg total) by mouth 3 (three) times a day for 5 days, Disp: 15 tablet, Rfl: 0    Trelegy Ellipta 200-62.5-25 MCG/ACT AEPB inhaler, , Disp: , Rfl:   No current facility-administered medications for this visit.    Current Allergies     Allergies as of 11/01/2024 - Reviewed 11/01/2024   Allergen Reaction Noted    Aspirin  Anaphylaxis 06/02/2022    Nsaids Facial Swelling 12/20/2017            The following portions of the patient's history were reviewed and updated as appropriate: allergies, current medications, past family history, past medical history, past social history, past surgical history and problem list.     Past Medical History:   Diagnosis Date    Asthma     Hypertension        Past Surgical History:   Procedure Laterality Date    APPENDECTOMY         History reviewed. No pertinent family history.      Medications have been verified.        Objective   /82   Pulse 82   Temp (!) 97.4 °F (36.3 °C) (Tympanic)   Resp 20   Wt 72.1 kg (159 lb)   SpO2 92%   BMI 24.90 kg/m²        Physical Exam     Physical Exam  Vitals and nursing note reviewed.   Constitutional:       General: She is not in acute distress.     Appearance: Normal appearance. She is normal weight. She is not ill-appearing, toxic-appearing or diaphoretic.   HENT:      Right Ear: Tympanic membrane, ear canal and external ear normal.      Left Ear: Tympanic membrane, ear canal and external ear normal.      Nose: Nose normal.      Mouth/Throat:      Mouth: Mucous membranes are moist.      Pharynx: No posterior oropharyngeal erythema.   Cardiovascular:      Rate and Rhythm: Normal rate and regular rhythm.   Pulmonary:      Effort: Pulmonary effort is normal.      Breath sounds: Wheezing present.   Chest:      Chest wall: Tenderness present.   Neurological:      Mental Status: She is alert.